# Patient Record
Sex: FEMALE | Race: WHITE | NOT HISPANIC OR LATINO | URBAN - METROPOLITAN AREA
[De-identification: names, ages, dates, MRNs, and addresses within clinical notes are randomized per-mention and may not be internally consistent; named-entity substitution may affect disease eponyms.]

---

## 2023-12-10 ENCOUNTER — INPATIENT (INPATIENT)
Facility: HOSPITAL | Age: 83
LOS: 2 days | Discharge: HOME | DRG: 871 | End: 2023-12-13
Attending: INTERNAL MEDICINE | Admitting: INTERNAL MEDICINE
Payer: MEDICAID

## 2023-12-10 VITALS
OXYGEN SATURATION: 89 % | RESPIRATION RATE: 24 BRPM | TEMPERATURE: 102 F | DIASTOLIC BLOOD PRESSURE: 70 MMHG | WEIGHT: 151.9 LBS | SYSTOLIC BLOOD PRESSURE: 122 MMHG | HEART RATE: 102 BPM

## 2023-12-10 DIAGNOSIS — R06.00 DYSPNEA, UNSPECIFIED: ICD-10-CM

## 2023-12-10 DIAGNOSIS — R07.9 CHEST PAIN, UNSPECIFIED: ICD-10-CM

## 2023-12-10 DIAGNOSIS — J18.9 PNEUMONIA, UNSPECIFIED ORGANISM: ICD-10-CM

## 2023-12-10 DIAGNOSIS — Z29.9 ENCOUNTER FOR PROPHYLACTIC MEASURES, UNSPECIFIED: ICD-10-CM

## 2023-12-10 DIAGNOSIS — N39.0 URINARY TRACT INFECTION, SITE NOT SPECIFIED: ICD-10-CM

## 2023-12-10 DIAGNOSIS — I10 ESSENTIAL (PRIMARY) HYPERTENSION: ICD-10-CM

## 2023-12-10 LAB
ALBUMIN SERPL ELPH-MCNC: 2.6 G/DL — LOW (ref 3.5–5)
ALBUMIN SERPL ELPH-MCNC: 2.6 G/DL — LOW (ref 3.5–5)
ALP SERPL-CCNC: 153 U/L — HIGH (ref 40–120)
ALP SERPL-CCNC: 153 U/L — HIGH (ref 40–120)
ALT FLD-CCNC: 41 U/L DA — SIGNIFICANT CHANGE UP (ref 10–60)
ALT FLD-CCNC: 41 U/L DA — SIGNIFICANT CHANGE UP (ref 10–60)
ANION GAP SERPL CALC-SCNC: 7 MMOL/L — SIGNIFICANT CHANGE UP (ref 5–17)
ANION GAP SERPL CALC-SCNC: 7 MMOL/L — SIGNIFICANT CHANGE UP (ref 5–17)
APPEARANCE UR: ABNORMAL
APPEARANCE UR: ABNORMAL
APTT BLD: 31.2 SEC — SIGNIFICANT CHANGE UP (ref 24.5–35.6)
APTT BLD: 31.2 SEC — SIGNIFICANT CHANGE UP (ref 24.5–35.6)
AST SERPL-CCNC: 28 U/L — SIGNIFICANT CHANGE UP (ref 10–40)
AST SERPL-CCNC: 28 U/L — SIGNIFICANT CHANGE UP (ref 10–40)
BACTERIA # UR AUTO: ABNORMAL /HPF
BACTERIA # UR AUTO: ABNORMAL /HPF
BASE EXCESS BLDV CALC-SCNC: 2.9 MMOL/L — SIGNIFICANT CHANGE UP
BASE EXCESS BLDV CALC-SCNC: 2.9 MMOL/L — SIGNIFICANT CHANGE UP
BASOPHILS # BLD AUTO: 0.06 K/UL — SIGNIFICANT CHANGE UP (ref 0–0.2)
BASOPHILS # BLD AUTO: 0.06 K/UL — SIGNIFICANT CHANGE UP (ref 0–0.2)
BASOPHILS NFR BLD AUTO: 0.5 % — SIGNIFICANT CHANGE UP (ref 0–2)
BASOPHILS NFR BLD AUTO: 0.5 % — SIGNIFICANT CHANGE UP (ref 0–2)
BILIRUB SERPL-MCNC: 1 MG/DL — SIGNIFICANT CHANGE UP (ref 0.2–1.2)
BILIRUB SERPL-MCNC: 1 MG/DL — SIGNIFICANT CHANGE UP (ref 0.2–1.2)
BILIRUB UR-MCNC: ABNORMAL
BILIRUB UR-MCNC: ABNORMAL
BUN SERPL-MCNC: 17 MG/DL — SIGNIFICANT CHANGE UP (ref 7–18)
BUN SERPL-MCNC: 17 MG/DL — SIGNIFICANT CHANGE UP (ref 7–18)
CA-I SERPL-SCNC: SIGNIFICANT CHANGE UP MMOL/L (ref 1.15–1.33)
CA-I SERPL-SCNC: SIGNIFICANT CHANGE UP MMOL/L (ref 1.15–1.33)
CALCIUM SERPL-MCNC: 8.9 MG/DL — SIGNIFICANT CHANGE UP (ref 8.4–10.5)
CALCIUM SERPL-MCNC: 8.9 MG/DL — SIGNIFICANT CHANGE UP (ref 8.4–10.5)
CHLORIDE SERPL-SCNC: 103 MMOL/L — SIGNIFICANT CHANGE UP (ref 96–108)
CHLORIDE SERPL-SCNC: 103 MMOL/L — SIGNIFICANT CHANGE UP (ref 96–108)
CO2 SERPL-SCNC: 24 MMOL/L — SIGNIFICANT CHANGE UP (ref 22–31)
CO2 SERPL-SCNC: 24 MMOL/L — SIGNIFICANT CHANGE UP (ref 22–31)
COD CRY URNS QL: PRESENT
COD CRY URNS QL: PRESENT
COLOR SPEC: SIGNIFICANT CHANGE UP
COLOR SPEC: SIGNIFICANT CHANGE UP
CREAT SERPL-MCNC: 0.98 MG/DL — SIGNIFICANT CHANGE UP (ref 0.5–1.3)
CREAT SERPL-MCNC: 0.98 MG/DL — SIGNIFICANT CHANGE UP (ref 0.5–1.3)
DIFF PNL FLD: ABNORMAL
DIFF PNL FLD: ABNORMAL
EGFR: 57 ML/MIN/1.73M2 — LOW
EGFR: 57 ML/MIN/1.73M2 — LOW
EOSINOPHIL # BLD AUTO: 0.02 K/UL — SIGNIFICANT CHANGE UP (ref 0–0.5)
EOSINOPHIL # BLD AUTO: 0.02 K/UL — SIGNIFICANT CHANGE UP (ref 0–0.5)
EOSINOPHIL NFR BLD AUTO: 0.2 % — SIGNIFICANT CHANGE UP (ref 0–6)
EOSINOPHIL NFR BLD AUTO: 0.2 % — SIGNIFICANT CHANGE UP (ref 0–6)
EPI CELLS # UR: PRESENT
EPI CELLS # UR: PRESENT
ERYTHROCYTE [SEDIMENTATION RATE] IN BLOOD: 87 MM/HR — HIGH (ref 0–20)
ERYTHROCYTE [SEDIMENTATION RATE] IN BLOOD: 87 MM/HR — HIGH (ref 0–20)
GAS PNL BLDV: 132 MMOL/L — LOW (ref 136–145)
GAS PNL BLDV: 132 MMOL/L — LOW (ref 136–145)
GAS PNL BLDV: SIGNIFICANT CHANGE UP
GAS PNL BLDV: SIGNIFICANT CHANGE UP
GLUCOSE SERPL-MCNC: 115 MG/DL — HIGH (ref 70–99)
GLUCOSE SERPL-MCNC: 115 MG/DL — HIGH (ref 70–99)
GLUCOSE UR QL: NEGATIVE MG/DL — SIGNIFICANT CHANGE UP
GLUCOSE UR QL: NEGATIVE MG/DL — SIGNIFICANT CHANGE UP
HCO3 BLDV-SCNC: 26 MMOL/L — SIGNIFICANT CHANGE UP (ref 22–29)
HCO3 BLDV-SCNC: 26 MMOL/L — SIGNIFICANT CHANGE UP (ref 22–29)
HCT VFR BLD CALC: 34.3 % — LOW (ref 34.5–45)
HCT VFR BLD CALC: 34.3 % — LOW (ref 34.5–45)
HGB BLD-MCNC: 11.4 G/DL — LOW (ref 11.5–15.5)
HGB BLD-MCNC: 11.4 G/DL — LOW (ref 11.5–15.5)
IMM GRANULOCYTES NFR BLD AUTO: 0.4 % — SIGNIFICANT CHANGE UP (ref 0–0.9)
IMM GRANULOCYTES NFR BLD AUTO: 0.4 % — SIGNIFICANT CHANGE UP (ref 0–0.9)
INR BLD: 1.16 RATIO — SIGNIFICANT CHANGE UP (ref 0.85–1.18)
INR BLD: 1.16 RATIO — SIGNIFICANT CHANGE UP (ref 0.85–1.18)
KETONES UR-MCNC: 15 MG/DL
KETONES UR-MCNC: 15 MG/DL
LACTATE BLDV-MCNC: 1.5 MMOL/L — SIGNIFICANT CHANGE UP (ref 0.5–2)
LACTATE BLDV-MCNC: 1.5 MMOL/L — SIGNIFICANT CHANGE UP (ref 0.5–2)
LACTATE SERPL-SCNC: 1.2 MMOL/L — SIGNIFICANT CHANGE UP (ref 0.7–2)
LACTATE SERPL-SCNC: 1.2 MMOL/L — SIGNIFICANT CHANGE UP (ref 0.7–2)
LEUKOCYTE ESTERASE UR-ACNC: ABNORMAL
LEUKOCYTE ESTERASE UR-ACNC: ABNORMAL
LYMPHOCYTES # BLD AUTO: 1.21 K/UL — SIGNIFICANT CHANGE UP (ref 1–3.3)
LYMPHOCYTES # BLD AUTO: 1.21 K/UL — SIGNIFICANT CHANGE UP (ref 1–3.3)
LYMPHOCYTES # BLD AUTO: 10.2 % — LOW (ref 13–44)
LYMPHOCYTES # BLD AUTO: 10.2 % — LOW (ref 13–44)
MCHC RBC-ENTMCNC: 30 PG — SIGNIFICANT CHANGE UP (ref 27–34)
MCHC RBC-ENTMCNC: 30 PG — SIGNIFICANT CHANGE UP (ref 27–34)
MCHC RBC-ENTMCNC: 33.2 GM/DL — SIGNIFICANT CHANGE UP (ref 32–36)
MCHC RBC-ENTMCNC: 33.2 GM/DL — SIGNIFICANT CHANGE UP (ref 32–36)
MCV RBC AUTO: 90.3 FL — SIGNIFICANT CHANGE UP (ref 80–100)
MCV RBC AUTO: 90.3 FL — SIGNIFICANT CHANGE UP (ref 80–100)
MONOCYTES # BLD AUTO: 1.02 K/UL — HIGH (ref 0–0.9)
MONOCYTES # BLD AUTO: 1.02 K/UL — HIGH (ref 0–0.9)
MONOCYTES NFR BLD AUTO: 8.6 % — SIGNIFICANT CHANGE UP (ref 2–14)
MONOCYTES NFR BLD AUTO: 8.6 % — SIGNIFICANT CHANGE UP (ref 2–14)
NEUTROPHILS # BLD AUTO: 9.54 K/UL — HIGH (ref 1.8–7.4)
NEUTROPHILS # BLD AUTO: 9.54 K/UL — HIGH (ref 1.8–7.4)
NEUTROPHILS NFR BLD AUTO: 80.1 % — HIGH (ref 43–77)
NEUTROPHILS NFR BLD AUTO: 80.1 % — HIGH (ref 43–77)
NITRITE UR-MCNC: POSITIVE
NITRITE UR-MCNC: POSITIVE
NRBC # BLD: 0 /100 WBCS — SIGNIFICANT CHANGE UP (ref 0–0)
NRBC # BLD: 0 /100 WBCS — SIGNIFICANT CHANGE UP (ref 0–0)
NT-PROBNP SERPL-SCNC: 936 PG/ML — HIGH (ref 0–450)
NT-PROBNP SERPL-SCNC: 936 PG/ML — HIGH (ref 0–450)
PCO2 BLDV: 34 MMHG — LOW (ref 39–42)
PCO2 BLDV: 34 MMHG — LOW (ref 39–42)
PH BLDV: 7.49 — HIGH (ref 7.32–7.43)
PH BLDV: 7.49 — HIGH (ref 7.32–7.43)
PH UR: 5.5 — SIGNIFICANT CHANGE UP (ref 5–8)
PH UR: 5.5 — SIGNIFICANT CHANGE UP (ref 5–8)
PLATELET # BLD AUTO: 353 K/UL — SIGNIFICANT CHANGE UP (ref 150–400)
PLATELET # BLD AUTO: 353 K/UL — SIGNIFICANT CHANGE UP (ref 150–400)
PO2 BLDV: 55 MMHG — SIGNIFICANT CHANGE UP
PO2 BLDV: 55 MMHG — SIGNIFICANT CHANGE UP
POTASSIUM BLDV-SCNC: 3.7 MMOL/L — SIGNIFICANT CHANGE UP (ref 3.5–5.1)
POTASSIUM BLDV-SCNC: 3.7 MMOL/L — SIGNIFICANT CHANGE UP (ref 3.5–5.1)
POTASSIUM SERPL-MCNC: 3.5 MMOL/L — SIGNIFICANT CHANGE UP (ref 3.5–5.3)
POTASSIUM SERPL-MCNC: 3.5 MMOL/L — SIGNIFICANT CHANGE UP (ref 3.5–5.3)
POTASSIUM SERPL-SCNC: 3.5 MMOL/L — SIGNIFICANT CHANGE UP (ref 3.5–5.3)
POTASSIUM SERPL-SCNC: 3.5 MMOL/L — SIGNIFICANT CHANGE UP (ref 3.5–5.3)
PROT SERPL-MCNC: 7.7 G/DL — SIGNIFICANT CHANGE UP (ref 6–8.3)
PROT SERPL-MCNC: 7.7 G/DL — SIGNIFICANT CHANGE UP (ref 6–8.3)
PROT UR-MCNC: 100 MG/DL
PROT UR-MCNC: 100 MG/DL
PROTHROM AB SERPL-ACNC: 13.2 SEC — HIGH (ref 9.5–13)
PROTHROM AB SERPL-ACNC: 13.2 SEC — HIGH (ref 9.5–13)
RAPID RVP RESULT: SIGNIFICANT CHANGE UP
RAPID RVP RESULT: SIGNIFICANT CHANGE UP
RBC # BLD: 3.8 M/UL — SIGNIFICANT CHANGE UP (ref 3.8–5.2)
RBC # BLD: 3.8 M/UL — SIGNIFICANT CHANGE UP (ref 3.8–5.2)
RBC # FLD: 14.4 % — SIGNIFICANT CHANGE UP (ref 10.3–14.5)
RBC # FLD: 14.4 % — SIGNIFICANT CHANGE UP (ref 10.3–14.5)
RBC CASTS # UR COMP ASSIST: 5 /HPF — HIGH (ref 0–4)
RBC CASTS # UR COMP ASSIST: 5 /HPF — HIGH (ref 0–4)
SAO2 % BLDV: 87.6 % — SIGNIFICANT CHANGE UP
SAO2 % BLDV: 87.6 % — SIGNIFICANT CHANGE UP
SARS-COV-2 RNA SPEC QL NAA+PROBE: SIGNIFICANT CHANGE UP
SARS-COV-2 RNA SPEC QL NAA+PROBE: SIGNIFICANT CHANGE UP
SODIUM SERPL-SCNC: 134 MMOL/L — LOW (ref 135–145)
SODIUM SERPL-SCNC: 134 MMOL/L — LOW (ref 135–145)
SP GR SPEC: 1.02 — SIGNIFICANT CHANGE UP (ref 1–1.03)
SP GR SPEC: 1.02 — SIGNIFICANT CHANGE UP (ref 1–1.03)
TROPONIN I, HIGH SENSITIVITY RESULT: 38.3 NG/L — SIGNIFICANT CHANGE UP
TROPONIN I, HIGH SENSITIVITY RESULT: 38.3 NG/L — SIGNIFICANT CHANGE UP
TROPONIN I, HIGH SENSITIVITY RESULT: 41.5 NG/L — SIGNIFICANT CHANGE UP
TROPONIN I, HIGH SENSITIVITY RESULT: 41.5 NG/L — SIGNIFICANT CHANGE UP
UROBILINOGEN FLD QL: 2 MG/DL (ref 0.2–1)
UROBILINOGEN FLD QL: 2 MG/DL (ref 0.2–1)
WBC # BLD: 11.9 K/UL — HIGH (ref 3.8–10.5)
WBC # BLD: 11.9 K/UL — HIGH (ref 3.8–10.5)
WBC # FLD AUTO: 11.9 K/UL — HIGH (ref 3.8–10.5)
WBC # FLD AUTO: 11.9 K/UL — HIGH (ref 3.8–10.5)
WBC UR QL: 50 /HPF — HIGH (ref 0–5)
WBC UR QL: 50 /HPF — HIGH (ref 0–5)

## 2023-12-10 PROCEDURE — 99285 EMERGENCY DEPT VISIT HI MDM: CPT

## 2023-12-10 PROCEDURE — 71045 X-RAY EXAM CHEST 1 VIEW: CPT | Mod: 26

## 2023-12-10 RX ORDER — SODIUM CHLORIDE 9 MG/ML
500 INJECTION INTRAMUSCULAR; INTRAVENOUS; SUBCUTANEOUS ONCE
Refills: 0 | Status: COMPLETED | OUTPATIENT
Start: 2023-12-10 | End: 2023-12-10

## 2023-12-10 RX ORDER — ACETAMINOPHEN 500 MG
650 TABLET ORAL EVERY 6 HOURS
Refills: 0 | Status: DISCONTINUED | OUTPATIENT
Start: 2023-12-10 | End: 2023-12-13

## 2023-12-10 RX ORDER — SODIUM CHLORIDE 9 MG/ML
1000 INJECTION INTRAMUSCULAR; INTRAVENOUS; SUBCUTANEOUS ONCE
Refills: 0 | Status: COMPLETED | OUTPATIENT
Start: 2023-12-10 | End: 2023-12-10

## 2023-12-10 RX ORDER — AZITHROMYCIN 500 MG/1
500 TABLET, FILM COATED ORAL EVERY 24 HOURS
Refills: 0 | Status: DISCONTINUED | OUTPATIENT
Start: 2023-12-11 | End: 2023-12-13

## 2023-12-10 RX ORDER — ENOXAPARIN SODIUM 100 MG/ML
40 INJECTION SUBCUTANEOUS EVERY 24 HOURS
Refills: 0 | Status: DISCONTINUED | OUTPATIENT
Start: 2023-12-10 | End: 2023-12-13

## 2023-12-10 RX ORDER — ACETAMINOPHEN 500 MG
1000 TABLET ORAL ONCE
Refills: 0 | Status: COMPLETED | OUTPATIENT
Start: 2023-12-10 | End: 2023-12-10

## 2023-12-10 RX ORDER — CEFTRIAXONE 500 MG/1
1000 INJECTION, POWDER, FOR SOLUTION INTRAMUSCULAR; INTRAVENOUS EVERY 24 HOURS
Refills: 0 | Status: DISCONTINUED | OUTPATIENT
Start: 2023-12-11 | End: 2023-12-13

## 2023-12-10 RX ORDER — CEFTRIAXONE 500 MG/1
1000 INJECTION, POWDER, FOR SOLUTION INTRAMUSCULAR; INTRAVENOUS ONCE
Refills: 0 | Status: COMPLETED | OUTPATIENT
Start: 2023-12-10 | End: 2023-12-10

## 2023-12-10 RX ORDER — AZITHROMYCIN 500 MG/1
500 TABLET, FILM COATED ORAL ONCE
Refills: 0 | Status: COMPLETED | OUTPATIENT
Start: 2023-12-10 | End: 2023-12-10

## 2023-12-10 RX ADMIN — Medication 1000 MILLIGRAM(S): at 17:44

## 2023-12-10 RX ADMIN — CEFTRIAXONE 100 MILLIGRAM(S): 500 INJECTION, POWDER, FOR SOLUTION INTRAMUSCULAR; INTRAVENOUS at 16:28

## 2023-12-10 RX ADMIN — Medication 400 MILLIGRAM(S): at 14:55

## 2023-12-10 RX ADMIN — SODIUM CHLORIDE 500 MILLILITER(S): 9 INJECTION INTRAMUSCULAR; INTRAVENOUS; SUBCUTANEOUS at 16:33

## 2023-12-10 RX ADMIN — Medication 650 MILLIGRAM(S): at 21:40

## 2023-12-10 RX ADMIN — SODIUM CHLORIDE 1000 MILLILITER(S): 9 INJECTION INTRAMUSCULAR; INTRAVENOUS; SUBCUTANEOUS at 14:55

## 2023-12-10 RX ADMIN — Medication 650 MILLIGRAM(S): at 21:10

## 2023-12-10 RX ADMIN — SODIUM CHLORIDE 1000 MILLILITER(S): 9 INJECTION INTRAMUSCULAR; INTRAVENOUS; SUBCUTANEOUS at 15:57

## 2023-12-10 RX ADMIN — ENOXAPARIN SODIUM 40 MILLIGRAM(S): 100 INJECTION SUBCUTANEOUS at 21:10

## 2023-12-10 RX ADMIN — AZITHROMYCIN 500 MILLIGRAM(S): 500 TABLET, FILM COATED ORAL at 16:28

## 2023-12-10 RX ADMIN — Medication 1000 MILLIGRAM(S): at 15:47

## 2023-12-10 NOTE — H&P ADULT - HISTORY OF PRESENT ILLNESS
75 yrs old F, from home, ambulating with walker, pmhx of CAD, ?MI 7 yrs ago, presented with cough, fever, chills, urinary symptoms, and myalgia. Pt stated that for the past 6 months, she had been having cough, which has worsened for the past 10 days, along with fever and chills of 2 days duration. Pt endorsed dysuria mildly improved along with urinary frequency and urgency without any blood in urine. Pt reported of having chest pain after cough has started, central pressure like without radiation to the arm, neck, jaw, intermittent, associated with cough, denied palpitation abdominal pain, N/V/D, swelling of the legs, headache, dizziness or blurring of vision.   Pt had recently traveled from [place] about 2 weeks ago, she was diagnosed with PNA and completed course of Abx at that time. Pt endorsed being exposed to TB [her mother] in the past however did not have any personal history of TB, denied sick contacts.   Pt and family at bedside and over the phone, reported of history of MI about 7 yrs ago however no stent placement or other interventions done, denied other hx of cardiac diseases, and last echo was done about 2 yrs ago.  75 yrs old F, from home, ambulating with walker, pmhx of CAD, ?MI 7 yrs ago, presented with cough, fever, chills, urinary symptoms, and myalgia. Pt stated that for the past 6 months, she had been having cough, which has worsened for the past 10 days, along with fever and chills of 2 days duration. Pt endorsed dysuria mildly improved along with urinary frequency and urgency without any blood in urine. Pt reported of having chest pain after cough has started, central pressure like without radiation to the arm, neck, jaw, intermittent, associated with cough, denied palpitation abdominal pain, N/V/D, swelling of the legs, headache, dizziness or blurring of vision.   Pt had recently traveled from [place] about 2 weeks ago, she was diagnosed with PNA and completed course of Abx at that time. Pt endorsed being exposed to TB [her mother] in the past however did not have any personal history of TB, denied sick contacts.   Pt and family at bedside and over the phone, reported of history of MI about 7 yrs ago however no stent placement or other interventions done, denied other hx of cardiac diseases, and last echo was done about 2 yrs ago.

## 2023-12-10 NOTE — H&P ADULT - NSHPREVIEWOFSYSTEMS_GEN_ALL_CORE
REVIEW OF SYSTEMS:    CONSTITUTIONAL: No weakness, + fevers or chills  EYES/ENT: No visual changes;  No vertigo or throat pain   NECK: No pain or stiffness  RESPIRATORY: No cough, wheezing, hemoptysis; + shortness of breath  CARDIOVASCULAR: No chest pain or palpitations  GASTROINTESTINAL: No abdominal or epigastric pain. No nausea, vomiting, or hematemesis; No diarrhea or constipation. No melena or hematochezia.  GENITOURINARY: + dysuria, frequency or hematuria  NEUROLOGICAL: No numbness or weakness  SKIN: No itching, rashes

## 2023-12-10 NOTE — H&P ADULT - PROBLEM SELECTOR PLAN 4
DVT- Lovenox chest pain associated with cough  trop x2 neg   EKG NSR  murmur noted on exam  f/u Echo  Cardio Dr. Oviedo consulted

## 2023-12-10 NOTE — H&P ADULT - NSHPPHYSICALEXAM_GEN_ALL_CORE
GENERAL: NAD, lying in bed comfortably  HEAD:  Atraumatic, Normocephalic  EYES: EOMI, PERRLA, conjunctiva and sclera clear  ENT: Moist mucous membranes  NECK: Supple, No JVD  CHEST/LUNG: Decreased air entry on Rt lower lobe, No rales, rhonchi, + wheezing, Unlabored respirations, on 2L NC   HEART: Regular rate and rhythm; + systolic murmur at the Rt upper sternal border with radiation to the left upper sternal border, + murmur present at apex, No rubs, or gallops  ABDOMEN: Bowel sounds present; Soft, Nontender, Nondistended.   EXTREMITIES:  2+ Peripheral Pulses, brisk capillary refill. No clubbing, cyanosis, or edema  NERVOUS SYSTEM:  Alert & Oriented X3, speech clear. No deficits   MSK: FROM all 4 extremities, full and equal strength  SKIN: No rashes or lesions

## 2023-12-10 NOTE — ED PROVIDER NOTE - PHYSICAL EXAMINATION
Constitutional: Well-appearing, well-nourished, comfortable appearing.   Head: Normocephalic, atraumatic.   Eyes: PERRL. EOMI. No conjunctival pallor.   ENT: Dry mucous membranes. Uvula midline. No pharyngeal erythema or exudates.  Neck: No LAD. Supple.  CVS: Tachycardic rate, regular rhythm. Normal S1, S2. No murmurs, rubs, or gallops. No peripheral edema noted.   Respiratory: No respiratory distress. Clear to auscultation bilaterally. No wheezing, rales, or rhonchi.   Abdomen: Abd is soft and non-distended. No tenderness. No rebound, guarding, or rigidity.   MSK: No CVA tenderness bilaterally.   Neuro: Alert and oriented to person, place, and time. Follows commands. Moves all extremities.   Skin: Hot to the touch and dry. No rashes.   Psych: Normal affect, cooperative.

## 2023-12-10 NOTE — H&P ADULT - PROBLEM SELECTOR PLAN 2
p/w dyspnea and cough  worsening cough for past 10 days however cough present for 6 months at least  hx of TB exposure many years ago   no personal hx of TB   f/u TB quantiferone test  f/u CTA to rule out for PE [recent hx of long- haul flight]   rest of plan as above [PNA] p/w dyspnea and cough  worsening cough for past 10 days however cough present for 6 months at least  hx of TB exposure many years ago , no personal hx of TB   f/u TB quantiferone test  f/u CTA to rule out for PE [recent hx of long- haul flight]   rest of plan as above [PNA]

## 2023-12-10 NOTE — ED PROVIDER NOTE - OBJECTIVE STATEMENT
83-year-old female with past medical history of CAD, HTN presenting to the ED for evaluation of worsening cough over the past week associated with posttussive chest and rib pain.  Daughter states that the patient has also been febrile at home with chills and myalgias and arthralgias.  They went to an urgent care this morning and found the patient to be 89% on room air with a COVID-negative test.  Patient denies any changes in vision, changes in speech, numbness, weakness, headache, dysuria, hematuria, flank pain, nausea, vomiting, diarrhea, abdominal pain, and any additional complaints at this time.  No sick contacts.    PMD: None

## 2023-12-10 NOTE — H&P ADULT - ASSESSMENT
75 yrs old F, from home, ambulating with walker, pmhx of CAD, ?MI 7 yrs ago, presented with cough, fever, chills, urinary symptoms, and myalgia. Pt is admitted for pneumonia, UTI and further evaluation of dyspnea including rule out PE.

## 2023-12-10 NOTE — ED PROVIDER NOTE - CLINICAL SUMMARY MEDICAL DECISION MAKING FREE TEXT BOX
83-year-old female with CAD, HTN, here with fever, cough, chills, myalgias and arthralgias over the past week and hypoxic at outside urgent care.  In the ED, the patient is febrile, mildly tachycardic, normotensive, and satting 87 to 89% on room air.  Patient placed on 3 L nasal cannula with improvement to the mid to high 90s.  Patient given Ofirmev for fever.  Septic workup initiated.  Patient received 1.5 L of fluids.  RVP sent.  Likely admit.

## 2023-12-10 NOTE — H&P ADULT - ATTENDING COMMENTS
Pt's presentation appearing consistent with reactive airway disease (with report of long-standing h/o asthmatic COPD) w/ negative RVP thus far and no reports of any expectorations. However given [place] ([place], [place] lives[place] will consider r/o T.b. At present will initiate rx for CAP (given b/l consolidations on cxr)  / UTI with IV zithro / rocephin. Pulm consult (Dr Daly) to follow. Pt's presentation appearing consistent with reactive airway disease (with report of long-standing h/o asthmatic COPD) w/ negative RVP thus far and no reports of any expectorations. However given her [place], [place] lives[place] will consider r/o T.b. At present will initiate rx for CAP (given b/l consolidations on cxr)  / UTI with IV zithro / rocephin. Pulm consult (Dr Daly) to follow.

## 2023-12-10 NOTE — H&P ADULT - PROBLEM SELECTOR PLAN 3
hx of HTN on Nebivolol   will hold home meds for now as normotensive p/w dysuria, frequency and urgency   U/A positive  c/w Rocephin   f/u urine culture

## 2023-12-10 NOTE — H&P ADULT - PROBLEM SELECTOR PLAN 1
p/w cough, fever, chills, dyspnea   CXR: Bilateral PNA  in ED: [id number] , ,  /70 Sat 89% on RA   mild leukoctyosis 11.9 with left shift p/w cough, fever, chills, dyspnea   CXR: Bilateral PNA  in ED: [id number] , ,  /70 Sat 89% on RA   mild leukoctyosis 11.9 with left shift p/w cough, fever, chills, dyspnea   CXR: Bilateral lunf infiltrates, irregular infiltrate of the right upper hilum   in ED: [id number] , ,  /70 Sat 89% on RA   mild leukocytosis 11.9 with left shift  s/p Azithro and Rocephin and LR 1.5L  bolus   RVP panel neg   c/w Azithro and Rocephin   f/u blood cultures, sputum culture, legionella, and strep pneumo Ag urine  Dr. Daly Pulm consulted p/w cough, fever, chills, dyspnea   CXR: Bilateral lunf infiltrates, irregular infiltrate of the right upper hilum   in ED: [id number] , ,  /70 Sat 89% on RA   mild leukocytosis 11.9 with left shift  s/p Azithro and Rocephin and LR 1.5L  bolus   RVP panel neg   c/w Azithro and Rocephin   f/u blood cultures, sputum culture, legionella, and strep pneumo Ag urine  Dr. Daly Pulm consulted

## 2023-12-10 NOTE — H&P ADULT - NSHPLABSRESULTS_GEN_ALL_CORE
ACC: [id number] EXAM:  XR CHEST PORTABLE URGENT 1V   ORDERED BY:  CORAL GODWIN     PROCEDURE DATE:  12/10/2023          INTERPRETATION:  AP chest on December 10, 2023 at 1:53 PM. Patient has   cough with fever and hypoxia.    COMPARISON:None available.    Heart magnified by technique.    There are mild bibasilar infiltrates left greater than right.    Another irregular infiltrate is suspect off the right upper hilum.    IMPRESSION: Bilateral lung infiltrates as above.    --- End of Report ---        CLIVE STALLWORTH MD; Attending Radiologist  This document has been electronically signed. Dec 10 2023  4:56PM ACC: [id number] EXAM:  XR CHEST PORTABLE URGENT 1V   ORDERED BY:  CORAL GODWIN     PROCEDURE DATE:  12/10/2023          INTERPRETATION:  AP chest on December 10, 2023 at 1:53 PM. Patient has   cough with fever and hypoxia.    COMPARISON:None available.    Heart magnified by technique.    There are mild bibasilar infiltrates left greater than right.    Another irregular infiltrate is suspect off the right upper hilum.    IMPRESSION: Bilateral lung infiltrates as above.    --- End of Report ---        CLIVE STALLWORTH MD; Attending Radiologist  This document has been electronically signed. Dec 10 2023  4:56PM

## 2023-12-11 DIAGNOSIS — J18.9 PNEUMONIA, UNSPECIFIED ORGANISM: ICD-10-CM

## 2023-12-11 LAB
ALBUMIN SERPL ELPH-MCNC: 2.2 G/DL — LOW (ref 3.5–5)
ALBUMIN SERPL ELPH-MCNC: 2.2 G/DL — LOW (ref 3.5–5)
ALP SERPL-CCNC: 137 U/L — HIGH (ref 40–120)
ALP SERPL-CCNC: 137 U/L — HIGH (ref 40–120)
ALT FLD-CCNC: 39 U/L DA — SIGNIFICANT CHANGE UP (ref 10–60)
ALT FLD-CCNC: 39 U/L DA — SIGNIFICANT CHANGE UP (ref 10–60)
ANION GAP SERPL CALC-SCNC: 7 MMOL/L — SIGNIFICANT CHANGE UP (ref 5–17)
ANION GAP SERPL CALC-SCNC: 7 MMOL/L — SIGNIFICANT CHANGE UP (ref 5–17)
AST SERPL-CCNC: 31 U/L — SIGNIFICANT CHANGE UP (ref 10–40)
AST SERPL-CCNC: 31 U/L — SIGNIFICANT CHANGE UP (ref 10–40)
BASOPHILS # BLD AUTO: 0.06 K/UL — SIGNIFICANT CHANGE UP (ref 0–0.2)
BASOPHILS # BLD AUTO: 0.06 K/UL — SIGNIFICANT CHANGE UP (ref 0–0.2)
BASOPHILS NFR BLD AUTO: 0.6 % — SIGNIFICANT CHANGE UP (ref 0–2)
BASOPHILS NFR BLD AUTO: 0.6 % — SIGNIFICANT CHANGE UP (ref 0–2)
BILIRUB SERPL-MCNC: 0.9 MG/DL — SIGNIFICANT CHANGE UP (ref 0.2–1.2)
BILIRUB SERPL-MCNC: 0.9 MG/DL — SIGNIFICANT CHANGE UP (ref 0.2–1.2)
BUN SERPL-MCNC: 13 MG/DL — SIGNIFICANT CHANGE UP (ref 7–18)
BUN SERPL-MCNC: 13 MG/DL — SIGNIFICANT CHANGE UP (ref 7–18)
CALCIUM SERPL-MCNC: 8.3 MG/DL — LOW (ref 8.4–10.5)
CALCIUM SERPL-MCNC: 8.3 MG/DL — LOW (ref 8.4–10.5)
CHLORIDE SERPL-SCNC: 105 MMOL/L — SIGNIFICANT CHANGE UP (ref 96–108)
CHLORIDE SERPL-SCNC: 105 MMOL/L — SIGNIFICANT CHANGE UP (ref 96–108)
CO2 SERPL-SCNC: 23 MMOL/L — SIGNIFICANT CHANGE UP (ref 22–31)
CO2 SERPL-SCNC: 23 MMOL/L — SIGNIFICANT CHANGE UP (ref 22–31)
CREAT SERPL-MCNC: 0.83 MG/DL — SIGNIFICANT CHANGE UP (ref 0.5–1.3)
CREAT SERPL-MCNC: 0.83 MG/DL — SIGNIFICANT CHANGE UP (ref 0.5–1.3)
CRP SERPL-MCNC: 303 MG/L — HIGH
CRP SERPL-MCNC: 303 MG/L — HIGH
EGFR: 69 ML/MIN/1.73M2 — SIGNIFICANT CHANGE UP
EGFR: 69 ML/MIN/1.73M2 — SIGNIFICANT CHANGE UP
EOSINOPHIL # BLD AUTO: 0.06 K/UL — SIGNIFICANT CHANGE UP (ref 0–0.5)
EOSINOPHIL # BLD AUTO: 0.06 K/UL — SIGNIFICANT CHANGE UP (ref 0–0.5)
EOSINOPHIL NFR BLD AUTO: 0.6 % — SIGNIFICANT CHANGE UP (ref 0–6)
EOSINOPHIL NFR BLD AUTO: 0.6 % — SIGNIFICANT CHANGE UP (ref 0–6)
GLUCOSE SERPL-MCNC: 113 MG/DL — HIGH (ref 70–99)
GLUCOSE SERPL-MCNC: 113 MG/DL — HIGH (ref 70–99)
HCT VFR BLD CALC: 28.5 % — LOW (ref 34.5–45)
HCT VFR BLD CALC: 28.5 % — LOW (ref 34.5–45)
HGB BLD-MCNC: 9.3 G/DL — LOW (ref 11.5–15.5)
HGB BLD-MCNC: 9.3 G/DL — LOW (ref 11.5–15.5)
IMM GRANULOCYTES NFR BLD AUTO: 0.7 % — SIGNIFICANT CHANGE UP (ref 0–0.9)
IMM GRANULOCYTES NFR BLD AUTO: 0.7 % — SIGNIFICANT CHANGE UP (ref 0–0.9)
LEGIONELLA AG UR QL: NEGATIVE — SIGNIFICANT CHANGE UP
LEGIONELLA AG UR QL: NEGATIVE — SIGNIFICANT CHANGE UP
LYMPHOCYTES # BLD AUTO: 1.37 K/UL — SIGNIFICANT CHANGE UP (ref 1–3.3)
LYMPHOCYTES # BLD AUTO: 1.37 K/UL — SIGNIFICANT CHANGE UP (ref 1–3.3)
LYMPHOCYTES # BLD AUTO: 14.2 % — SIGNIFICANT CHANGE UP (ref 13–44)
LYMPHOCYTES # BLD AUTO: 14.2 % — SIGNIFICANT CHANGE UP (ref 13–44)
MAGNESIUM SERPL-MCNC: 1.7 MG/DL — SIGNIFICANT CHANGE UP (ref 1.6–2.6)
MAGNESIUM SERPL-MCNC: 1.7 MG/DL — SIGNIFICANT CHANGE UP (ref 1.6–2.6)
MCHC RBC-ENTMCNC: 29.1 PG — SIGNIFICANT CHANGE UP (ref 27–34)
MCHC RBC-ENTMCNC: 29.1 PG — SIGNIFICANT CHANGE UP (ref 27–34)
MCHC RBC-ENTMCNC: 32.6 GM/DL — SIGNIFICANT CHANGE UP (ref 32–36)
MCHC RBC-ENTMCNC: 32.6 GM/DL — SIGNIFICANT CHANGE UP (ref 32–36)
MCV RBC AUTO: 89.1 FL — SIGNIFICANT CHANGE UP (ref 80–100)
MCV RBC AUTO: 89.1 FL — SIGNIFICANT CHANGE UP (ref 80–100)
MONOCYTES # BLD AUTO: 0.89 K/UL — SIGNIFICANT CHANGE UP (ref 0–0.9)
MONOCYTES # BLD AUTO: 0.89 K/UL — SIGNIFICANT CHANGE UP (ref 0–0.9)
MONOCYTES NFR BLD AUTO: 9.2 % — SIGNIFICANT CHANGE UP (ref 2–14)
MONOCYTES NFR BLD AUTO: 9.2 % — SIGNIFICANT CHANGE UP (ref 2–14)
NEUTROPHILS # BLD AUTO: 7.21 K/UL — SIGNIFICANT CHANGE UP (ref 1.8–7.4)
NEUTROPHILS # BLD AUTO: 7.21 K/UL — SIGNIFICANT CHANGE UP (ref 1.8–7.4)
NEUTROPHILS NFR BLD AUTO: 74.7 % — SIGNIFICANT CHANGE UP (ref 43–77)
NEUTROPHILS NFR BLD AUTO: 74.7 % — SIGNIFICANT CHANGE UP (ref 43–77)
NRBC # BLD: 0 /100 WBCS — SIGNIFICANT CHANGE UP (ref 0–0)
NRBC # BLD: 0 /100 WBCS — SIGNIFICANT CHANGE UP (ref 0–0)
PHOSPHATE SERPL-MCNC: 2.3 MG/DL — LOW (ref 2.5–4.5)
PHOSPHATE SERPL-MCNC: 2.3 MG/DL — LOW (ref 2.5–4.5)
PLATELET # BLD AUTO: 306 K/UL — SIGNIFICANT CHANGE UP (ref 150–400)
PLATELET # BLD AUTO: 306 K/UL — SIGNIFICANT CHANGE UP (ref 150–400)
POTASSIUM SERPL-MCNC: 3.3 MMOL/L — LOW (ref 3.5–5.3)
POTASSIUM SERPL-MCNC: 3.3 MMOL/L — LOW (ref 3.5–5.3)
POTASSIUM SERPL-SCNC: 3.3 MMOL/L — LOW (ref 3.5–5.3)
POTASSIUM SERPL-SCNC: 3.3 MMOL/L — LOW (ref 3.5–5.3)
PROT SERPL-MCNC: 6.6 G/DL — SIGNIFICANT CHANGE UP (ref 6–8.3)
PROT SERPL-MCNC: 6.6 G/DL — SIGNIFICANT CHANGE UP (ref 6–8.3)
RBC # BLD: 3.2 M/UL — LOW (ref 3.8–5.2)
RBC # BLD: 3.2 M/UL — LOW (ref 3.8–5.2)
RBC # FLD: 14.6 % — HIGH (ref 10.3–14.5)
RBC # FLD: 14.6 % — HIGH (ref 10.3–14.5)
S PNEUM AG UR QL: NEGATIVE — SIGNIFICANT CHANGE UP
S PNEUM AG UR QL: NEGATIVE — SIGNIFICANT CHANGE UP
SODIUM SERPL-SCNC: 135 MMOL/L — SIGNIFICANT CHANGE UP (ref 135–145)
SODIUM SERPL-SCNC: 135 MMOL/L — SIGNIFICANT CHANGE UP (ref 135–145)
WBC # BLD: 9.66 K/UL — SIGNIFICANT CHANGE UP (ref 3.8–10.5)
WBC # BLD: 9.66 K/UL — SIGNIFICANT CHANGE UP (ref 3.8–10.5)
WBC # FLD AUTO: 9.66 K/UL — SIGNIFICANT CHANGE UP (ref 3.8–10.5)
WBC # FLD AUTO: 9.66 K/UL — SIGNIFICANT CHANGE UP (ref 3.8–10.5)

## 2023-12-11 PROCEDURE — 71275 CT ANGIOGRAPHY CHEST: CPT | Mod: 26

## 2023-12-11 RX ORDER — BENZOCAINE AND MENTHOL 5; 1 G/100ML; G/100ML
1 LIQUID ORAL EVERY 4 HOURS
Refills: 0 | Status: DISCONTINUED | OUTPATIENT
Start: 2023-12-11 | End: 2023-12-13

## 2023-12-11 RX ORDER — INFLUENZA VIRUS VACCINE 15; 15; 15; 15 UG/.5ML; UG/.5ML; UG/.5ML; UG/.5ML
0.7 SUSPENSION INTRAMUSCULAR ONCE
Refills: 0 | Status: DISCONTINUED | OUTPATIENT
Start: 2023-12-11 | End: 2023-12-13

## 2023-12-11 RX ORDER — POTASSIUM CHLORIDE 20 MEQ
40 PACKET (EA) ORAL ONCE
Refills: 0 | Status: COMPLETED | OUTPATIENT
Start: 2023-12-11 | End: 2023-12-11

## 2023-12-11 RX ORDER — ACETAMINOPHEN 500 MG
1000 TABLET ORAL ONCE
Refills: 0 | Status: COMPLETED | OUTPATIENT
Start: 2023-12-11 | End: 2023-12-11

## 2023-12-11 RX ORDER — IPRATROPIUM/ALBUTEROL SULFATE 18-103MCG
3 AEROSOL WITH ADAPTER (GRAM) INHALATION EVERY 6 HOURS
Refills: 0 | Status: DISCONTINUED | OUTPATIENT
Start: 2023-12-11 | End: 2023-12-13

## 2023-12-11 RX ORDER — LANOLIN ALCOHOL/MO/W.PET/CERES
5 CREAM (GRAM) TOPICAL ONCE
Refills: 0 | Status: COMPLETED | OUTPATIENT
Start: 2023-12-11 | End: 2023-12-11

## 2023-12-11 RX ADMIN — Medication 3 MILLILITER(S): at 20:26

## 2023-12-11 RX ADMIN — Medication 1000 MILLIGRAM(S): at 23:15

## 2023-12-11 RX ADMIN — Medication 650 MILLIGRAM(S): at 04:31

## 2023-12-11 RX ADMIN — Medication 650 MILLIGRAM(S): at 05:01

## 2023-12-11 RX ADMIN — AZITHROMYCIN 255 MILLIGRAM(S): 500 TABLET, FILM COATED ORAL at 16:45

## 2023-12-11 RX ADMIN — Medication 100 MILLIGRAM(S): at 21:44

## 2023-12-11 RX ADMIN — Medication 100 MILLIGRAM(S): at 14:03

## 2023-12-11 RX ADMIN — Medication 40 MILLIEQUIVALENT(S): at 16:29

## 2023-12-11 RX ADMIN — Medication 5 MILLIGRAM(S): at 00:37

## 2023-12-11 RX ADMIN — CEFTRIAXONE 100 MILLIGRAM(S): 500 INJECTION, POWDER, FOR SOLUTION INTRAMUSCULAR; INTRAVENOUS at 16:45

## 2023-12-11 RX ADMIN — Medication 3 MILLILITER(S): at 11:54

## 2023-12-11 RX ADMIN — Medication 400 MILLIGRAM(S): at 22:22

## 2023-12-11 RX ADMIN — Medication 3 MILLILITER(S): at 16:33

## 2023-12-11 RX ADMIN — Medication 100 MILLIGRAM(S): at 21:46

## 2023-12-11 RX ADMIN — Medication 100 MILLIGRAM(S): at 11:54

## 2023-12-11 RX ADMIN — ENOXAPARIN SODIUM 40 MILLIGRAM(S): 100 INJECTION SUBCUTANEOUS at 21:44

## 2023-12-11 NOTE — PROGRESS NOTE ADULT - SUBJECTIVE AND OBJECTIVE BOX
MARILEE ESPINOZA  MR# [id number]  83yFemale        Patient is a 83y old  Female who presents with a chief complaint of sepsis 2/2 PNA and UTI (11 Dec 2023 11:51)      INTERVAL HPI/OVERNIGHT EVENTS:  Patient seen and examined at bedside. No notations of chest pain, palpitation, SOB, orthopnea, nausea, vomiting or abdominal pain.    ALLERGIES  No Known Allergies      MEDICATIONS  acetaminophen     Tablet .. 650 milliGRAM(s) Oral every 6 hours PRN Temp greater or equal to 38C (100.4F), Mild Pain (1 - 3)  albuterol/ipratropium for Nebulization 3 milliLiter(s) Nebulizer every 6 hours  azithromycin  IVPB 500 milliGRAM(s) IV Intermittent every 24 hours  benzocaine/menthol Lozenge 1 Lozenge Oral every 4 hours PRN Sore Throat  benzonatate 100 milliGRAM(s) Oral three times a day  cefTRIAXone   IVPB 1000 milliGRAM(s) IV Intermittent every 24 hours  enoxaparin Injectable 40 milliGRAM(s) SubCutaneous every 24 hours  guaiFENesin Oral Liquid (Sugar-Free) 100 milliGRAM(s) Oral every 6 hours PRN Cough              REVIEW OF SYSTEMS:  CONSTITUTIONAL: No fever, weight loss, or fatigue  EYES: No eye pain, visual disturbances, or discharge  ENT:  No difficulty hearing, tinnitus, vertigo; No sinus or throat pain  NECK: No pain or stiffness  RESPIRATORY: No cough, wheezing, chills or hemoptysis; No Shortness of Breath  CARDIOVASCULAR: No chest pain, palpitations, passing out, dizziness, or leg swelling  GASTROINTESTINAL: No abdominal or epigastric pain. No nausea, vomiting, or hematemesis; No diarrhea or constipation. No melena or hematochezia.  GENITOURINARY: No dysuria, frequency, hematuria, or incontinence  NEUROLOGICAL: No headaches, memory loss, loss of strength, numbness, or tremors  SKIN: No itching, burning, rashes, or lesions   LYMPH Nodes: No enlarged glands  ENDOCRINE: No heat or cold intolerance; No hair loss  MUSCULOSKELETAL: No joint pain or swelling; No muscle, back, or extremity pain  PSYCHIATRIC: No depression, anxiety, mood swings, or difficulty sleeping  HEME/LYMPH: No easy bruising, or bleeding gums  ALLERGY AND IMMUNOLOGIC: No hives or eczema	    [ ] All others negative	  [ ] Unable to obtain      T(C): 36.7 (12-11-23 @ 11:39), Max: 39 (12-10-23 @ 12:56)  T(F): 98 (12-11-23 @ 11:39), Max: 102.2 (12-10-23 @ 12:56)  HR: 74 (12-11-23 @ 11:39) (71 - 102)  BP: 97/60 (12-11-23 @ 11:39) (96/58 - 122/70)  RR: 20 (12-11-23 @ 11:39) (20 - 24)  SpO2: 96% (12-11-23 @ 11:39) (89% - 99%)  Wt(kg): --    Weight (kg): 68.9 (12-10 @ 12:56)  I&O's Summary        PHYSICAL EXAM:  A X O x  HEAD:  Atraumatic, Normocephalic  EYES: EOMI, PERRLA, conjunctiva and sclera clear  NECK: Supple, No JVD, Normal thyroid  Resp: CTAB, No crackles, wheezing,   CVS: Regular rate and rhythm; No discernable murmurs, rubs, or gallops  ABD: Soft, Nontender, Nondistended; Bowel sounds present  EXTREMITIES:  2+ Peripheral Pulses, No edema  LYMPH: No dicernable lymphadenopathy noted  GENERAL: NAD, well-groomed, well-developed      LABS:                        9.3    9.66  )-----------( 306      ( 11 Dec 2023 05:40 )             28.5     12-11    135  |  105  |  13  ----------------------------<  113<H>  3.3<L>   |  23  |  0.83    Ca    8.3<L>      11 Dec 2023 05:40  Phos  2.3     12-11  Mg     1.7     12-11    TPro  6.6  /  Alb  2.2<L>  /  TBili  0.9  /  DBili  x   /  AST  31  /  ALT  39  /  AlkPhos  137<H>  12-11    PT/INR - ( 10 Dec 2023 14:45 )   PT: 13.2 sec;   INR: 1.16 ratio         PTT - ( 10 Dec 2023 14:45 )  PTT:31.2 sec  Urinalysis Basic - ( 11 Dec 2023 05:40 )    Color: x / Appearance: x / SG: x / pH: x  Gluc: 113 mg/dL / Ketone: x  / Bili: x / Urobili: x   Blood: x / Protein: x / Nitrite: x   Leuk Esterase: x / RBC: x / WBC x   Sq Epi: x / Non Sq Epi: x / Bacteria: x      CAPILLARY BLOOD GLUCOSE          Troponins:  ProBNP:  Lipid Profile:   HgA1c:  TSH:           RADIOLOGY & ADDITIONAL TESTS:    Imaging Personally Reviewed:  [ ] YES  [ ] NO      Consultant(s) Notes Reviewed:  [x ] YES  [ ] NO    Care Discussed with Consultants/Other Providers [ x] YES  [ ] NO          PAST MEDICAL & SURGICAL HISTORY:  CAD (coronary artery disease)      HTN (hypertension)            Pneumonia due to infectious organism    Handoff    MEWS Score    CAD (coronary artery disease)    HTN (hypertension)    Pneumonia    Pneumonia    HTN (hypertension)    Prophylactic measure    Dyspnea    Acute UTI    Chest pain    W/DIFF BREATHING,CHEST PAIN    Hypoxia    Fever    [email or web address]             MARILEE ESPINOZA  MR# [id number]  83yFemale        Patient is a 83y old  Female who presents with a chief complaint of sepsis 2/2 PNA and UTI (11 Dec 2023 11:51)      INTERVAL HPI/OVERNIGHT EVENTS:  Patient seen and examined at bedside. No notations of chest pain, palpitation, SOB, orthopnea, nausea, vomiting or abdominal pain.    ALLERGIES  No Known Allergies      MEDICATIONS  acetaminophen     Tablet .. 650 milliGRAM(s) Oral every 6 hours PRN Temp greater or equal to 38C (100.4F), Mild Pain (1 - 3)  albuterol/ipratropium for Nebulization 3 milliLiter(s) Nebulizer every 6 hours  azithromycin  IVPB 500 milliGRAM(s) IV Intermittent every 24 hours  benzocaine/menthol Lozenge 1 Lozenge Oral every 4 hours PRN Sore Throat  benzonatate 100 milliGRAM(s) Oral three times a day  cefTRIAXone   IVPB 1000 milliGRAM(s) IV Intermittent every 24 hours  enoxaparin Injectable 40 milliGRAM(s) SubCutaneous every 24 hours  guaiFENesin Oral Liquid (Sugar-Free) 100 milliGRAM(s) Oral every 6 hours PRN Cough              REVIEW OF SYSTEMS:  CONSTITUTIONAL: No fever, weight loss, or fatigue  EYES: No eye pain, visual disturbances, or discharge  ENT:  No difficulty hearing, tinnitus, vertigo; No sinus or throat pain  NECK: No pain or stiffness  RESPIRATORY: No cough, wheezing, chills or hemoptysis; No Shortness of Breath  CARDIOVASCULAR: No chest pain, palpitations, passing out, dizziness, or leg swelling  GASTROINTESTINAL: No abdominal or epigastric pain. No nausea, vomiting, or hematemesis; No diarrhea or constipation. No melena or hematochezia.  GENITOURINARY: No dysuria, frequency, hematuria, or incontinence  NEUROLOGICAL: No headaches, memory loss, loss of strength, numbness, or tremors  SKIN: No itching, burning, rashes, or lesions   LYMPH Nodes: No enlarged glands  ENDOCRINE: No heat or cold intolerance; No hair loss  MUSCULOSKELETAL: No joint pain or swelling; No muscle, back, or extremity pain  PSYCHIATRIC: No depression, anxiety, mood swings, or difficulty sleeping  HEME/LYMPH: No easy bruising, or bleeding gums  ALLERGY AND IMMUNOLOGIC: No hives or eczema	    [ ] All others negative	  [ ] Unable to obtain      T(C): 36.7 (12-11-23 @ 11:39), Max: 39 (12-10-23 @ 12:56)  T(F): 98 (12-11-23 @ 11:39), Max: 102.2 (12-10-23 @ 12:56)  HR: 74 (12-11-23 @ 11:39) (71 - 102)  BP: 97/60 (12-11-23 @ 11:39) (96/58 - 122/70)  RR: 20 (12-11-23 @ 11:39) (20 - 24)  SpO2: 96% (12-11-23 @ 11:39) (89% - 99%)  Wt(kg): --    Weight (kg): 68.9 (12-10 @ 12:56)  I&O's Summary        PHYSICAL EXAM:  A X O x  HEAD:  Atraumatic, Normocephalic  EYES: EOMI, PERRLA, conjunctiva and sclera clear  NECK: Supple, No JVD, Normal thyroid  Resp: CTAB, No crackles, wheezing,   CVS: Regular rate and rhythm; No discernable murmurs, rubs, or gallops  ABD: Soft, Nontender, Nondistended; Bowel sounds present  EXTREMITIES:  2+ Peripheral Pulses, No edema  LYMPH: No dicernable lymphadenopathy noted  GENERAL: NAD, well-groomed, well-developed      LABS:                        9.3    9.66  )-----------( 306      ( 11 Dec 2023 05:40 )             28.5     12-11    135  |  105  |  13  ----------------------------<  113<H>  3.3<L>   |  23  |  0.83    Ca    8.3<L>      11 Dec 2023 05:40  Phos  2.3     12-11  Mg     1.7     12-11    TPro  6.6  /  Alb  2.2<L>  /  TBili  0.9  /  DBili  x   /  AST  31  /  ALT  39  /  AlkPhos  137<H>  12-11    PT/INR - ( 10 Dec 2023 14:45 )   PT: 13.2 sec;   INR: 1.16 ratio         PTT - ( 10 Dec 2023 14:45 )  PTT:31.2 sec  Urinalysis Basic - ( 11 Dec 2023 05:40 )    Color: x / Appearance: x / SG: x / pH: x  Gluc: 113 mg/dL / Ketone: x  / Bili: x / Urobili: x   Blood: x / Protein: x / Nitrite: x   Leuk Esterase: x / RBC: x / WBC x   Sq Epi: x / Non Sq Epi: x / Bacteria: x      CAPILLARY BLOOD GLUCOSE          Troponins:  ProBNP:  Lipid Profile:   HgA1c:  TSH:           RADIOLOGY & ADDITIONAL TESTS:    Imaging Personally Reviewed:  [ ] YES  [ ] NO      Consultant(s) Notes Reviewed:  [x ] YES  [ ] NO    Care Discussed with Consultants/Other Providers [ x] YES  [ ] NO          PAST MEDICAL & SURGICAL HISTORY:  CAD (coronary artery disease)      HTN (hypertension)            Pneumonia due to infectious organism    Handoff    MEWS Score    CAD (coronary artery disease)    HTN (hypertension)    Pneumonia    Pneumonia    HTN (hypertension)    Prophylactic measure    Dyspnea    Acute UTI    Chest pain    W/DIFF BREATHING,CHEST PAIN    Hypoxia    Fever    [email or web address]

## 2023-12-11 NOTE — PROGRESS NOTE ADULT - SUBJECTIVE AND OBJECTIVE BOX
NP Note discussed with  primary attending    Patient is a 83y old  Female who presents with a chief complaint of sepsis 2/2 PNA and UTI (11 Dec 2023 12:32)      INTERVAL HPI/OVERNIGHT EVENTS: no new complaints, pt seen at bedside with pneumonia symptoms. Productive coughing with wheezing     MEDICATIONS  (STANDING):  albuterol/ipratropium for Nebulization 3 milliLiter(s) Nebulizer every 6 hours  azithromycin  IVPB 500 milliGRAM(s) IV Intermittent every 24 hours  benzonatate 100 milliGRAM(s) Oral three times a day  cefTRIAXone   IVPB 1000 milliGRAM(s) IV Intermittent every 24 hours  enoxaparin Injectable 40 milliGRAM(s) SubCutaneous every 24 hours  influenza  Vaccine (HIGH DOSE) 0.7 milliLiter(s) IntraMuscular once    MEDICATIONS  (PRN):  acetaminophen     Tablet .. 650 milliGRAM(s) Oral every 6 hours PRN Temp greater or equal to 38C (100.4F), Mild Pain (1 - 3)  benzocaine/menthol Lozenge 1 Lozenge Oral every 4 hours PRN Sore Throat  guaiFENesin Oral Liquid (Sugar-Free) 100 milliGRAM(s) Oral every 6 hours PRN Cough      __________________________________________________  REVIEW OF SYSTEMS:    CONSTITUTIONAL: No fever,   EYES: no acute visual disturbances  NECK: No pain or stiffness  RESPIRATORY:  wet cough  CARDIOVASCULAR: No chest pain, no palpitations  GASTROINTESTINAL: No pain. No nausea or vomiting; No diarrhea   NEUROLOGICAL: No headache or numbness, no tremors  MUSCULOSKELETAL: No joint pain, no muscle pain  GENITOURINARY: no dysuria, no frequency, no hesitancy  PSYCHIATRY: no depression , no anxiety  ALL OTHER  ROS negative        Vital Signs Last 24 Hrs  T(C): 36.2 (11 Dec 2023 12:58), Max: 38.9 (10 Dec 2023 22:55)  T(F): 97.2 (11 Dec 2023 12:58), Max: 102.1 (10 Dec 2023 22:55)  HR: 89 (11 Dec 2023 12:58) (71 - 94)  BP: 99/59 (11 Dec 2023 12:58) (96/58 - 112/69)  BP(mean): 73 (11 Dec 2023 11:39) (73 - 82)  RR: 19 (11 Dec 2023 12:58) (19 - 20)  SpO2: 94% (11 Dec 2023 12:58) (90% - 99%)    Parameters below as of 11 Dec 2023 12:58  Patient On (Oxygen Delivery Method): nasal cannula  O2 Flow (L/min): 2      ________________________________________________  PHYSICAL EXAM:  GENERAL: NAD  HEENT: Normocephalic;  conjunctivae and sclerae clear; dry mucous membranes;   NECK : supple  CHEST/LUNG: diminished with auscultation, productive coughing with wheezing   HEART: S1 S2  regular; no murmurs, gallops or rubs  ABDOMEN: Soft, Nontender, Nondistended; Bowel sounds present  EXTREMITIES: no cyanosis; no edema; no calf tenderness  SKIN: warm and dry; no rash  NERVOUS SYSTEM:  Awake and alert; Oriented  to place, person and time ; no new deficits    _________________________________________________  LABS:                        9.3    9.66  )-----------( 306      ( 11 Dec 2023 05:40 )             28.5     12-11    135  |  105  |  13  ----------------------------<  113<H>  3.3<L>   |  23  |  0.83    Ca    8.3<L>      11 Dec 2023 05:40  Phos  2.3     12-11  Mg     1.7     12-11    TPro  6.6  /  Alb  2.2<L>  /  TBili  0.9  /  DBili  x   /  AST  31  /  ALT  39  /  AlkPhos  137<H>  12-11    PT/INR - ( 10 Dec 2023 14:45 )   PT: 13.2 sec;   INR: 1.16 ratio         PTT - ( 10 Dec 2023 14:45 )  PTT:31.2 sec  Urinalysis Basic - ( 11 Dec 2023 05:40 )    Color: x / Appearance: x / SG: x / pH: x  Gluc: 113 mg/dL / Ketone: x  / Bili: x / Urobili: x   Blood: x / Protein: x / Nitrite: x   Leuk Esterase: x / RBC: x / WBC x   Sq Epi: x / Non Sq Epi: x / Bacteria: x      CAPILLARY BLOOD GLUCOSE            RADIOLOGY & ADDITIONAL TESTS:    < from: CT Angio Chest PE Protocol w/ IV Cont (12.11.23 @ 00:34) >  FINDINGS:    LUNGS AND AIRWAYS: Patent central airways.  Extensive multifocal   predominantly peripheral consolidative opacities of the upper and lower   lobes with confluent consolidations in the dependent lower lobes   bilaterally.  PLEURA: No pleural effusion.  MEDIASTINUM AND PONCHO: No lymphadenopathy.  VESSELS: No acute pulmonary embolism to the subsegmental level.   Atherosclerotic changes of the aorta and coronaries. Mildly dilated   ascending aorta measuring up to 3.5 cm. No aortic dissection.  HEART: Heart size is normal. No pericardial effusion.  CHEST WALL AND LOWER NECK: Within normal limits.  VISUALIZED UPPER ABDOMEN: Within normal limits.  BONES: Degenerative changes.    IMPRESSION:  No acute pulmonary embolism.    Extensive multifocal consolidations bilaterally concerning for multifocal   pneumonia.    < end of copied text >      Imaging Personally Reviewed:  YES/NO    Consultant(s) Notes Reviewed:   YES/ No    Care Discussed with Consultants :     Plan of care was discussed with patient and /or primary care giver; all questions and concerns were addressed and care was aligned with patient's wishes.

## 2023-12-11 NOTE — PATIENT PROFILE ADULT - NSPROPTRIGHTSUPPORTNAME_GEN_A_NUR
Yes
You can access the FollowMyHealth Patient Portal offered by [facility] by registering at the following website: [email or web address]. By joining [facility]’s FollowMyHealth portal, you will also be able to view your health information using other applications (apps) compatible with our system.
Chantale Overton

## 2023-12-11 NOTE — CONSULT NOTE ADULT - ASSESSMENT
75 yrs old F, from home, ambulating with walker, pmhx of CAD, ?MI 7 yrs ago, presented with cough, fever, chills, urinary symptoms, and myalgia,multifocal pneumonia,UTI.  1.Multifocal pneumonia,UTI-ABX.  2.Echocardiogram.  3.CAD-asa,statin.  4.GI and DVT prophylaxis.

## 2023-12-11 NOTE — PROGRESS NOTE ADULT - PROBLEM SELECTOR PLAN 5
chest pain associated with cough  trop x2 neg   EKG NSR  murmur noted on exam  f/u Echo  Cardio Dr. Oviedo consulted chest pain associated with cough  trop x2 neg   EKG NSR  murmur noted on exam  f/u Echo  Cardio Dr. Oviedo

## 2023-12-11 NOTE — CONSULT NOTE ADULT - SUBJECTIVE AND OBJECTIVE BOX
Time of visit:    CHIEF COMPLAINT: Patient is a 83y old  Female who presents with a chief complaint of sepsis 2/2 PNA and UTI (11 Dec 2023 12:32)      HPI:  75 yrs old F, from home, ambulating with walker, pmhx of CAD, ?MI 7 yrs ago, presented with cough, fever, chills, urinary symptoms, and myalgia. Pt stated that for the past 6 months, she had been having cough, which has worsened for the past 10 days, along with fever and chills of 2 days duration. Pt endorsed dysuria mildly improved along with urinary frequency and urgency without any blood in urine. Pt reported of having chest pain after cough has started, central pressure like without radiation to the arm, neck, jaw, intermittent, associated with cough, denied palpitation abdominal pain, N/V/D, swelling of the legs, headache, dizziness or blurring of vision.   Pt had recently traveled from [place] about 2 weeks ago, she was diagnosed with PNA and completed course of Abx at that time. Pt endorsed being exposed to TB [her mother] in the past however did not have any personal history of TB, denied sick contacts.   Pt and family at bedside and over the phone, reported of history of MI about 7 yrs ago however no stent placement or other interventions done, denied other hx of cardiac diseases, and last echo was done about 2 yrs ago.  (10 Dec 2023 19:03)   Patient seen and examined.     PAST MEDICAL & SURGICAL HISTORY:  CAD (coronary artery disease)      HTN (hypertension)          Allergies    No Known Allergies    Intolerances        MEDICATIONS  (STANDING):  albuterol/ipratropium for Nebulization 3 milliLiter(s) Nebulizer every 6 hours  azithromycin  IVPB 500 milliGRAM(s) IV Intermittent every 24 hours  benzonatate 100 milliGRAM(s) Oral three times a day  cefTRIAXone   IVPB 1000 milliGRAM(s) IV Intermittent every 24 hours  enoxaparin Injectable 40 milliGRAM(s) SubCutaneous every 24 hours  influenza  Vaccine (HIGH DOSE) 0.7 milliLiter(s) IntraMuscular once      MEDICATIONS  (PRN):  acetaminophen     Tablet .. 650 milliGRAM(s) Oral every 6 hours PRN Temp greater or equal to 38C (100.4F), Mild Pain (1 - 3)  benzocaine/menthol Lozenge 1 Lozenge Oral every 4 hours PRN Sore Throat  guaiFENesin Oral Liquid (Sugar-Free) 100 milliGRAM(s) Oral every 6 hours PRN Cough   Medications up to date at time of exam.    Medications up to date at time of exam.    FAMILY HISTORY:      SOCIAL HISTORY: Primary language Maltese , basic English. daughter at bedside translated .     Smoking History: Denies smoking/smoke exposure, [   ] former smoker  Living Condition: [   ] apartment, [   ] private house  Travel History: denies recent travel  Illicit Substance Use: denies  Alcohol Use: denies    REVIEW OF SYSTEMS:    CONSTITUTIONAL:  Presented with  fevers, chills. Denies night sweats.    HEENT:  Denies blurred vision. No sore throat nor runny nose.    CARDIOVASCULAR:  Presented with Chest pain , non radiating type.     RESPIRATORY:  Verbalized of episodic dyspnea . Non productive  cough. No wheezing on exam.     GASTROINTESTINAL:  Denies abdominal pain. No vomiting nor diarrhea.    GENITOURINARY: Verbalized of episode of urinary frequency. No hematuria.     NEUROLOGIC:  No tremors. No seizures on exam.     PSYCHIATRIC:  No emotional distress .     PHYSICAL EXAMINATION:    Vital Signs Last 24 Hrs  T(C): 36.2 (11 Dec 2023 12:58), Max: 38.9 (10 Dec 2023 22:55)  T(F): 97.2 (11 Dec 2023 12:58), Max: 102.1 (10 Dec 2023 22:55)  HR: 89 (11 Dec 2023 12:58) (71 - 98)  BP: 99/59 (11 Dec 2023 12:58) (96/58 - 112/69)  BP(mean): 73 (11 Dec 2023 11:39) (73 - 82)  RR: 19 (11 Dec 2023 12:58) (19 - 20)  SpO2: 94% (11 Dec 2023 12:58) (90% - 99%)    Parameters below as of 11 Dec 2023 12:58  Patient On (Oxygen Delivery Method): nasal cannula  O2 Flow (L/min): 2     (if applicable)    General : Alert and oriented . No acute distress .     HEENT: Head is normocephalic and atraumatic. No nasal tenderness. Extraocular muscles are intact. Mucous membranes are moist.     NECK: Supple, no palpable adenopathy.    LUNGS: Non labored. No wheezing. No use of accessory muscle.     HEART: S1 S2 Regular rate and no click / rub.     ABDOMEN: Soft, nontender, and nondistended.  Active bowel sounds.     ; No bladder distention and tenderness.     NEUROLOGIC: Awake, alert, oriented.     SKIN: Warm and moist . Non diaphoretic.       LABS:                        9.3    9.66  )-----------( 306      ( 11 Dec 2023 05:40 )             28.5     12-11    135  |  105  |  13  ----------------------------<  113<H>  3.3<L>   |  23  |  0.83    Ca    8.3<L>      11 Dec 2023 05:40  Phos  2.3     12-11  Mg     1.7     12-11    TPro  6.6  /  Alb  2.2<L>  /  TBili  0.9  /  DBili  x   /  AST  31  /  ALT  39  /  AlkPhos  137<H>  12-11    PT/INR - ( 10 Dec 2023 14:45 )   PT: 13.2 sec;   INR: 1.16 ratio         PTT - ( 10 Dec 2023 14:45 )  PTT:31.2 sec  Urinalysis Basic - ( 11 Dec 2023 05:40 )    Color: x / Appearance: x / SG: x / pH: x  Gluc: 113 mg/dL / Ketone: x  / Bili: x / Urobili: x   Blood: x / Protein: x / Nitrite: x   Leuk Esterase: x / RBC: x / WBC x   Sq Epi: x / Non Sq Epi: x / Bacteria: x        MICROBIOLOGY: (if applicable)    RADIOLOGY & ADDITIONAL STUDIES:  CT Chest : < from: CT Angio Chest PE Protocol w/ IV Cont (12.11.23 @ 00:34) >    ACC: [id number] EXAM:  CT ANGIO CHEST PULM [facility]   ORDERED BY: FARRAH BORREGO     PROCEDURE DATE:  12/11/2023          INTERPRETATION:  CLINICAL INFORMATION: Dyspnea, recent long flight    COMPARISON: None.    CONTRAST/COMPLICATIONS:  IV Contrast:Omnipaque 350  90 cc administered   10 cc discarded  Oral Contrast: NONE  Complications: None reported at time of study completion    PROCEDURE:  CT Angiography of the Chest.  Sagittal and coronal reformats were performed as well as 3D (MIP)   reconstructions.    FINDINGS:    LUNGS AND AIRWAYS: Patent central airways.  Extensive multifocal   predominantly peripheral consolidative opacities of the upper and lower   lobes with confluent consolidations in the dependent lower lobes   bilaterally.  PLEURA: No pleural effusion.  MEDIASTINUM AND PONCHO: No lymphadenopathy.  VESSELS: No acute pulmonary embolism to the subsegmental level.   Atherosclerotic changes of the aorta and coronaries. Mildly dilated   ascending aorta measuring up to 3.5 cm. No aortic dissection.  HEART: Heart size is normal. No pericardial effusion.  CHEST WALL AND LOWER NECK: Within normal limits.  VISUALIZED UPPER ABDOMEN: Within normal limits.  BONES: Degenerative changes.    IMPRESSION:  No acute pulmonary embolism.    Extensive multifocal consolidations bilaterally concerning for multifocal   pneumonia.        --- End of Report ---            RUPA NASCIMENTO MD; Attending Radiologist  This document has been electronically signed. Dec 11 2023  3:15AM    < end of copied text >     CXR:< from: Xray Chest 1 View- PORTABLE-Urgent (12.10.23 @ 14:06) >    ACC: [id number] EXAM:  XR CHEST PORTABLE URGENT 1V   ORDERED BY:  CORAL GODWIN     PROCEDURE DATE:  12/10/2023          INTERPRETATION:  AP chest on December 10, 2023 at 1:53 PM. Patient has   cough with fever and hypoxia.    COMPARISON:None available.    Heart magnified by technique.    There are mild bibasilar infiltrates left greater than right.    Another irregular infiltrate is suspect off the right upper hilum.    IMPRESSION: Bilateral lung infiltrates as above.    --- End of Report ---            CLIVE STALLWORTH MD; Attending Radiologist  This document has been electronically signed. Dec 10 2023  4:56PM    < end of copied text >    ECHO:    IMPRESSION: 83y Female PAST MEDICAL & SURGICAL HISTORY:  CAD (coronary artery disease)      HTN (hypertension)    Impression: This is an 82 Y/O Female presented to ED with fever, chills, cough , urinary frequency and urgency and myalgia . Had been coughing for 6 Months which has worsened for past 10 Days. Recently traveled from [place] about 2 weeks ago which she was diagnosed with Pneumonia which she completed antibiotic at that time. For pulmonary evaluation due to  Acute hypoxic respiratory failure secondary to Multifocal Pneumonia , showing on CXR , CTPA . No PE. Pt endorsed being exposed to TB [her mother] in the past however did not have any personal history of TB.       Suggestion:  O2 saturation 94% with O2 supplement. Continue Oxygen supplementation 2L NC. Monitor O2 saturation trend.   Continue Duoneb via nebulization Q 6 Hours .  Benzonatate  100 mg 3x daily.  GuaiFENesin  PRN 3x Daily.  On Azithromycin 500 mg IVPB Daily.  On Ceftriaxone 1 Gm IVPB Daiy.       DVT / GI prophylactic : On Lovenox 40 mg SQ Daily.    Quantiferon test.  Blood Cx , Sputum Cx, Legionella UA .      Time of visit:    CHIEF COMPLAINT: Patient is a 83y old  Female who presents with a chief complaint of sepsis 2/2 PNA and UTI (11 Dec 2023 12:32)      HPI:  75 yrs old F, from home, ambulating with walker, pmhx of CAD, ?MI 7 yrs ago, presented with cough, fever, chills, urinary symptoms, and myalgia. Pt stated that for the past 6 months, she had been having cough, which has worsened for the past 10 days, along with fever and chills of 2 days duration. Pt endorsed dysuria mildly improved along with urinary frequency and urgency without any blood in urine. Pt reported of having chest pain after cough has started, central pressure like without radiation to the arm, neck, jaw, intermittent, associated with cough, denied palpitation abdominal pain, N/V/D, swelling of the legs, headache, dizziness or blurring of vision.   Pt had recently traveled from [place] about 2 weeks ago, she was diagnosed with PNA and completed course of Abx at that time. Pt endorsed being exposed to TB [her mother] in the past however did not have any personal history of TB, denied sick contacts.   Pt and family at bedside and over the phone, reported of history of MI about 7 yrs ago however no stent placement or other interventions done, denied other hx of cardiac diseases, and last echo was done about 2 yrs ago.  (10 Dec 2023 19:03)   Patient seen and examined.     PAST MEDICAL & SURGICAL HISTORY:  CAD (coronary artery disease)      HTN (hypertension)          Allergies    No Known Allergies    Intolerances        MEDICATIONS  (STANDING):  albuterol/ipratropium for Nebulization 3 milliLiter(s) Nebulizer every 6 hours  azithromycin  IVPB 500 milliGRAM(s) IV Intermittent every 24 hours  benzonatate 100 milliGRAM(s) Oral three times a day  cefTRIAXone   IVPB 1000 milliGRAM(s) IV Intermittent every 24 hours  enoxaparin Injectable 40 milliGRAM(s) SubCutaneous every 24 hours  influenza  Vaccine (HIGH DOSE) 0.7 milliLiter(s) IntraMuscular once      MEDICATIONS  (PRN):  acetaminophen     Tablet .. 650 milliGRAM(s) Oral every 6 hours PRN Temp greater or equal to 38C (100.4F), Mild Pain (1 - 3)  benzocaine/menthol Lozenge 1 Lozenge Oral every 4 hours PRN Sore Throat  guaiFENesin Oral Liquid (Sugar-Free) 100 milliGRAM(s) Oral every 6 hours PRN Cough   Medications up to date at time of exam.    Medications up to date at time of exam.    FAMILY HISTORY:      SOCIAL HISTORY: Primary language Nepalese , basic English. daughter at bedside translated .     Smoking History: Denies smoking/smoke exposure, [   ] former smoker  Living Condition: [   ] apartment, [   ] private house  Travel History: denies recent travel  Illicit Substance Use: denies  Alcohol Use: denies    REVIEW OF SYSTEMS:    CONSTITUTIONAL:  Presented with  fevers, chills. Denies night sweats.    HEENT:  Denies blurred vision. No sore throat nor runny nose.    CARDIOVASCULAR:  Presented with Chest pain , non radiating type.     RESPIRATORY:  Verbalized of episodic dyspnea . Non productive  cough. No wheezing on exam.     GASTROINTESTINAL:  Denies abdominal pain. No vomiting nor diarrhea.    GENITOURINARY: Verbalized of episode of urinary frequency. No hematuria.     NEUROLOGIC:  No tremors. No seizures on exam.     PSYCHIATRIC:  No emotional distress .     PHYSICAL EXAMINATION:    Vital Signs Last 24 Hrs  T(C): 36.2 (11 Dec 2023 12:58), Max: 38.9 (10 Dec 2023 22:55)  T(F): 97.2 (11 Dec 2023 12:58), Max: 102.1 (10 Dec 2023 22:55)  HR: 89 (11 Dec 2023 12:58) (71 - 98)  BP: 99/59 (11 Dec 2023 12:58) (96/58 - 112/69)  BP(mean): 73 (11 Dec 2023 11:39) (73 - 82)  RR: 19 (11 Dec 2023 12:58) (19 - 20)  SpO2: 94% (11 Dec 2023 12:58) (90% - 99%)    Parameters below as of 11 Dec 2023 12:58  Patient On (Oxygen Delivery Method): nasal cannula  O2 Flow (L/min): 2     (if applicable)    General : Alert and oriented . No acute distress .     HEENT: Head is normocephalic and atraumatic. No nasal tenderness. Extraocular muscles are intact. Mucous membranes are moist.     NECK: Supple, no palpable adenopathy.    LUNGS: Non labored. No wheezing. No use of accessory muscle.     HEART: S1 S2 Regular rate and no click / rub.     ABDOMEN: Soft, nontender, and nondistended.  Active bowel sounds.     ; No bladder distention and tenderness.     NEUROLOGIC: Awake, alert, oriented.     SKIN: Warm and moist . Non diaphoretic.       LABS:                        9.3    9.66  )-----------( 306      ( 11 Dec 2023 05:40 )             28.5     12-11    135  |  105  |  13  ----------------------------<  113<H>  3.3<L>   |  23  |  0.83    Ca    8.3<L>      11 Dec 2023 05:40  Phos  2.3     12-11  Mg     1.7     12-11    TPro  6.6  /  Alb  2.2<L>  /  TBili  0.9  /  DBili  x   /  AST  31  /  ALT  39  /  AlkPhos  137<H>  12-11    PT/INR - ( 10 Dec 2023 14:45 )   PT: 13.2 sec;   INR: 1.16 ratio         PTT - ( 10 Dec 2023 14:45 )  PTT:31.2 sec  Urinalysis Basic - ( 11 Dec 2023 05:40 )    Color: x / Appearance: x / SG: x / pH: x  Gluc: 113 mg/dL / Ketone: x  / Bili: x / Urobili: x   Blood: x / Protein: x / Nitrite: x   Leuk Esterase: x / RBC: x / WBC x   Sq Epi: x / Non Sq Epi: x / Bacteria: x        MICROBIOLOGY: (if applicable)    RADIOLOGY & ADDITIONAL STUDIES:  CT Chest : < from: CT Angio Chest PE Protocol w/ IV Cont (12.11.23 @ 00:34) >    ACC: [id number] EXAM:  CT ANGIO CHEST PULM [facility]   ORDERED BY: FARRAH BORREGO     PROCEDURE DATE:  12/11/2023          INTERPRETATION:  CLINICAL INFORMATION: Dyspnea, recent long flight    COMPARISON: None.    CONTRAST/COMPLICATIONS:  IV Contrast:Omnipaque 350  90 cc administered   10 cc discarded  Oral Contrast: NONE  Complications: None reported at time of study completion    PROCEDURE:  CT Angiography of the Chest.  Sagittal and coronal reformats were performed as well as 3D (MIP)   reconstructions.    FINDINGS:    LUNGS AND AIRWAYS: Patent central airways.  Extensive multifocal   predominantly peripheral consolidative opacities of the upper and lower   lobes with confluent consolidations in the dependent lower lobes   bilaterally.  PLEURA: No pleural effusion.  MEDIASTINUM AND PONCHO: No lymphadenopathy.  VESSELS: No acute pulmonary embolism to the subsegmental level.   Atherosclerotic changes of the aorta and coronaries. Mildly dilated   ascending aorta measuring up to 3.5 cm. No aortic dissection.  HEART: Heart size is normal. No pericardial effusion.  CHEST WALL AND LOWER NECK: Within normal limits.  VISUALIZED UPPER ABDOMEN: Within normal limits.  BONES: Degenerative changes.    IMPRESSION:  No acute pulmonary embolism.    Extensive multifocal consolidations bilaterally concerning for multifocal   pneumonia.        --- End of Report ---            RUPA NASCIMENTO MD; Attending Radiologist  This document has been electronically signed. Dec 11 2023  3:15AM    < end of copied text >     CXR:< from: Xray Chest 1 View- PORTABLE-Urgent (12.10.23 @ 14:06) >    ACC: [id number] EXAM:  XR CHEST PORTABLE URGENT 1V   ORDERED BY:  CORAL GODWIN     PROCEDURE DATE:  12/10/2023          INTERPRETATION:  AP chest on December 10, 2023 at 1:53 PM. Patient has   cough with fever and hypoxia.    COMPARISON:None available.    Heart magnified by technique.    There are mild bibasilar infiltrates left greater than right.    Another irregular infiltrate is suspect off the right upper hilum.    IMPRESSION: Bilateral lung infiltrates as above.    --- End of Report ---            CLIVE STALLWORTH MD; Attending Radiologist  This document has been electronically signed. Dec 10 2023  4:56PM    < end of copied text >    ECHO:    IMPRESSION: 83y Female PAST MEDICAL & SURGICAL HISTORY:  CAD (coronary artery disease)      HTN (hypertension)    Impression: This is an 84 Y/O Female presented to ED with fever, chills, cough , urinary frequency and urgency and myalgia . Had been coughing for 6 Months which has worsened for past 10 Days. Recently traveled from [place] about 2 weeks ago which she was diagnosed with Pneumonia which she completed antibiotic at that time. For pulmonary evaluation due to  Acute hypoxic respiratory failure secondary to Multifocal Pneumonia , showing on CXR , CTPA . No PE. Pt endorsed being exposed to TB [her mother] in the past however did not have any personal history of TB.       Suggestion:  O2 saturation 94% with O2 supplement. Continue Oxygen supplementation 2L NC. Monitor O2 saturation trend.   Continue Duoneb via nebulization Q 6 Hours .  Benzonatate  100 mg 3x daily.  GuaiFENesin  PRN 3x Daily.  On Azithromycin 500 mg IVPB Daily.  On Ceftriaxone 1 Gm IVPB Daiy.       DVT / GI prophylactic : On Lovenox 40 mg SQ Daily.    Quantiferon test.  Blood Cx , Sputum Cx, Legionella UA .

## 2023-12-11 NOTE — PATIENT PROFILE ADULT - PRO INTERPRETER NEED 2
[place] Cheek-To-Nose Interpolation Flap Text: A decision was made to reconstruct the defect utilizing an interpolation axial flap and a staged reconstruction.  A telfa template was made of the defect.  This telfa template was then used to outline the Cheek-To-Nose Interpolation flap.  The donor area for the pedicle flap was then injected with anesthesia.  The flap was excised through the skin and subcutaneous tissue down to the layer of the underlying musculature.  The interpolation flap was carefully excised within this deep plane to maintain its blood supply.  The edges of the donor site were undermined.   The donor site was closed in a primary fashion.  The pedicle was then rotated into position and sutured.  Once the tube was sutured into place, adequate blood supply was confirmed with blanching and refill.  The pedicle was then wrapped with xeroform gauze and dressed appropriately with a telfa and gauze bandage to ensure continued blood supply and protect the attached pedicle.

## 2023-12-11 NOTE — PROGRESS NOTE ADULT - PROBLEM SELECTOR PLAN 1
p/w cough, fever, chills, dyspnea   CXR: Bilateral lunf infiltrates, irregular infiltrate of the right upper hilum   RVP panel neg   c/w Azithro and Rocephin   duoneb nebulizers   tesselon pearls   guaifenesin oral syrup  cepacol lozenges   f/u quantiferon gold test   f/u blood cultures, sputum culture, legionella, and strep pneumo Ag urine  Dr. Daly Pulm consulted p/w cough, fever, chills, dyspnea   CXR: Bilateral lunf infiltrates, irregular infiltrate of the right upper hilum   RVP panel neg   c/w Azithro and Rocephin   duoneb nebulizers   tesselon pearls   guaifenesin oral syrup  cepacol lozenges   f/u quantiferon gold test   f/u blood cultures, sputum culture, legionella, and strep pneumo Ag urine  maintain 02 sat 93-95%  Dr. Daly Pulm

## 2023-12-11 NOTE — CONSULT NOTE ADULT - SUBJECTIVE AND OBJECTIVE BOX
Date of Service  12-11-23 @ 11:54    CHIEF COMPLAINT:Patient is a 83y old  Female who presents with a chief complaint of sepsis 2/2 PNA and UTI .      HPI:  75 yrs old F, from home, ambulating with walker, pmhx of CAD, ?MI 7 yrs ago, presented with cough, fever, chills, urinary symptoms, and myalgia. Pt stated that for the past 6 months, she had been having cough, which has worsened for the past 10 days, along with fever and chills of 2 days duration. Pt endorsed dysuria mildly improved along with urinary frequency and urgency without any blood in urine. Pt reported of having chest pain after cough has started, central pressure like without radiation to the arm, neck, jaw, intermittent, associated with cough, denied palpitation abdominal pain, N/V/D, swelling of the legs, headache, dizziness or blurring of vision.   Pt had recently traveled from [place] about 2 weeks ago, she was diagnosed with PNA and completed course of Abx at that time. Pt endorsed being exposed to TB [her mother] in the past however did not have any personal history of TB, denied sick contacts.   Pt and family at bedside and over the phone, reported of history of MI about 7 yrs ago however no stent placement or other interventions done, denied other hx of cardiac diseases, and last echo was done about 2 yrs ago.  (10 Dec 2023 19:03)      PAST MEDICAL & SURGICAL HISTORY:  CAD (coronary artery disease)      HTN (hypertension)          MEDICATIONS  (STANDING):  albuterol/ipratropium for Nebulization 3 milliLiter(s) Nebulizer every 6 hours  azithromycin  IVPB 500 milliGRAM(s) IV Intermittent every 24 hours  benzonatate 100 milliGRAM(s) Oral three times a day  cefTRIAXone   IVPB 1000 milliGRAM(s) IV Intermittent every 24 hours  enoxaparin Injectable 40 milliGRAM(s) SubCutaneous every 24 hours    MEDICATIONS  (PRN):  acetaminophen     Tablet .. 650 milliGRAM(s) Oral every 6 hours PRN Temp greater or equal to 38C (100.4F), Mild Pain (1 - 3)  benzocaine/menthol Lozenge 1 Lozenge Oral every 4 hours PRN Sore Throat  guaiFENesin Oral Liquid (Sugar-Free) 100 milliGRAM(s) Oral every 6 hours PRN Cough      FAMILY HISTORY:No hx of CAD      SOCIAL HISTORY:    [ x] Non-smoker    [x ] Alcohol-denies    Allergies    No Known Allergies    Intolerances    	    REVIEW OF SYSTEMS:  CONSTITUTIONAL: No fever, weight loss, or fatigue  EYES: No eye pain, visual disturbances, or discharge  ENT:  No difficulty hearing, tinnitus, vertigo; No sinus or throat pain  NECK: No pain or stiffness  RESPIRATORY: No cough, wheezing, chills or hemoptysis; + Shortness of Breath  CARDIOVASCULAR: No chest pain, palpitations, passing out, dizziness, or leg swelling  GASTROINTESTINAL: No abdominal or epigastric pain. No nausea, vomiting, or hematemesis; No diarrhea or constipation. No melena or hematochezia.  GENITOURINARY: No dysuria, frequency, hematuria, or incontinence  NEUROLOGICAL: No headaches, memory loss, loss of strength, numbness, or tremors  SKIN: No itching, burning, rashes, or lesions   LYMPH Nodes: No enlarged glands  ENDOCRINE: No heat or cold intolerance; No hair loss  MUSCULOSKELETAL: No joint pain or swelling; No muscle, back, or extremity pain  PSYCHIATRIC: No depression, anxiety, mood swings, or difficulty sleeping  HEME/LYMPH: No easy bruising, or bleeding gums  ALLERGY AND IMMUNOLOGIC: No hives or eczema	      PHYSICAL EXAM:  T(C): 36.7 (12-11-23 @ 11:39), Max: 39 (12-10-23 @ 12:56)  HR: 74 (12-11-23 @ 11:39) (71 - 102)  BP: 97/60 (12-11-23 @ 11:39) (96/58 - 122/70)  RR: 20 (12-11-23 @ 11:39) (20 - 24)  SpO2: 96% (12-11-23 @ 11:39) (89% - 99%)  Wt(kg): --  I&O's Summary      Appearance: Normal	  HEENT:   Normal oral mucosa, PERRL, EOMI	  Lymphatic: No lymphadenopathy  Cardiovascular: Normal S1 S2, No JVD, No murmurs, No edema  Respiratory: B/L ronchi	  Psychiatry: A & O x 3, Mood & affect appropriate  Gastrointestinal:  Soft, Non-tender, + BS	  Skin: No rashes, No ecchymoses, No cyanosis	  Neurologic: Non-focal  Extremities: Normal range of motion, No clubbing, cyanosis or edema  Vascular: Peripheral pulses palpable 2+ bilaterally        ECG:  	nsr,lvh,q inferior    LABS:	 	      Troponin I, High Sensitivity Result: 41.5 ng/L (12-10-23 @ 20:20)  Troponin I, High Sensitivity Result: 38.3 ng/L (12-10-23 @ 14:45)                          9.3    9.66  )-----------( 306      ( 11 Dec 2023 05:40 )             28.5     12-11    135  |  105  |  13  ----------------------------<  113<H>  3.3<L>   |  23  |  0.83    Ca    8.3<L>      11 Dec 2023 05:40  Phos  2.3     12-11  Mg     1.7     12-11    TPro  6.6  /  Alb  2.2<L>  /  TBili  0.9  /  DBili  x   /  AST  31  /  ALT  39  /  AlkPhos  137<H>  12-11    < from: CT Angio Chest PE Protocol w/ IV Cont (12.11.23 @ 00:34) >  ACC: [id number] EXAM:  CT ANGIO CHEST PULM [facility]   ORDERED BY: FARRAH BORREGO     PROCEDURE DATE:  12/11/2023          INTERPRETATION:  CLINICAL INFORMATION: Dyspnea, recent long flight    COMPARISON: None.    CONTRAST/COMPLICATIONS:  IV Contrast:Omnipaque 350  90 cc administered   10 cc discarded  Oral Contrast: NONE  Complications: None reported at time of study completion    PROCEDURE:  CT Angiography of the Chest.  Sagittal and coronal reformats were performed as well as 3D (MIP)   reconstructions.    FINDINGS:    LUNGS AND AIRWAYS: Patent central airways.  Extensive multifocal   predominantly peripheral consolidative opacities of the upper and lower   lobes with confluent consolidations in the dependent lower lobes   bilaterally.  PLEURA: No pleural effusion.  MEDIASTINUM AND PONCHO: No lymphadenopathy.  VESSELS: No acute pulmonary embolism to the subsegmental level.   Atherosclerotic changes of the aorta and coronaries. Mildly dilated   ascending aorta measuring up to 3.5 cm. No aortic dissection.  HEART: Heart size is normal. No pericardial effusion.  CHEST WALL AND LOWER NECK: Within normal limits.  VISUALIZED UPPER ABDOMEN: Within normal limits.  BONES: Degenerative changes.    IMPRESSION:  No acute pulmonary embolism.    Extensive multifocal consolidations bilaterally concerning for multifocal   pneumonia.        --- End of Report ---            RUPA NASCIMENTO MD; Attending Radiologist  This document has been electronically signed. Dec 11 2023  3:15AM    < end of copied text >  < from: Xray Chest 1 View- PORTABLE-Urgent (12.10.23 @ 14:06) >  ACC: [id number] EXAM:  XR CHEST PORTABLE URGENT 1V   ORDERED BY:  CORAL GODWIN     PROCEDURE DATE:  12/10/2023          INTERPRETATION:  AP chest on December 10, 2023 at 1:53 PM. Patient has   cough with fever and hypoxia.    COMPARISON:None available.    Heart magnified by technique.    There are mild bibasilar infiltrates left greater than right.    Another irregular infiltrate is suspect off the right upper hilum.    IMPRESSION: Bilateral lung infiltrates as above.    --- End of Report ---            CLIVE STALLWORTH MD; Attending Radiologist  This document has been electronically signed. Dec 10 2023  4:56PM    < end of copied text >       Date of Service  12-11-23 @ 11:54    CHIEF COMPLAINT:Patient is a 83y old  Female who presents with a chief complaint of sepsis 2/2 PNA and UTI .      HPI:  75 yrs old F, from home, ambulating with walker, pmhx of CAD, ?MI 7 yrs ago, presented with cough, fever, chills, urinary symptoms, and myalgia. Pt stated that for the past 6 months, she had been having cough, which has worsened for the past 10 days, along with fever and chills of 2 days duration. Pt endorsed dysuria mildly improved along with urinary frequency and urgency without any blood in urine. Pt reported of having chest pain after cough has started, central pressure like without radiation to the arm, neck, jaw, intermittent, associated with cough, denied palpitation abdominal pain, N/V/D, swelling of the legs, headache, dizziness or blurring of vision.   Pt had recently traveled from [place] about 2 weeks ago, she was diagnosed with PNA and completed course of Abx at that time. Pt endorsed being exposed to TB [her mother] in the past however did not have any personal history of TB, denied sick contacts.   Pt and family at bedside and over the phone, reported of history of MI about 7 yrs ago however no stent placement or other interventions done, denied other hx of cardiac diseases, and last echo was done about 2 yrs ago.  (10 Dec 2023 19:03)      PAST MEDICAL & SURGICAL HISTORY:  CAD (coronary artery disease)      HTN (hypertension)          MEDICATIONS  (STANDING):  albuterol/ipratropium for Nebulization 3 milliLiter(s) Nebulizer every 6 hours  azithromycin  IVPB 500 milliGRAM(s) IV Intermittent every 24 hours  benzonatate 100 milliGRAM(s) Oral three times a day  cefTRIAXone   IVPB 1000 milliGRAM(s) IV Intermittent every 24 hours  enoxaparin Injectable 40 milliGRAM(s) SubCutaneous every 24 hours    MEDICATIONS  (PRN):  acetaminophen     Tablet .. 650 milliGRAM(s) Oral every 6 hours PRN Temp greater or equal to 38C (100.4F), Mild Pain (1 - 3)  benzocaine/menthol Lozenge 1 Lozenge Oral every 4 hours PRN Sore Throat  guaiFENesin Oral Liquid (Sugar-Free) 100 milliGRAM(s) Oral every 6 hours PRN Cough      FAMILY HISTORY:No hx of CAD      SOCIAL HISTORY:    [ x] Non-smoker    [x ] Alcohol-denies    Allergies    No Known Allergies    Intolerances    	    REVIEW OF SYSTEMS:  CONSTITUTIONAL: No fever, weight loss, or fatigue  EYES: No eye pain, visual disturbances, or discharge  ENT:  No difficulty hearing, tinnitus, vertigo; No sinus or throat pain  NECK: No pain or stiffness  RESPIRATORY: No cough, wheezing, chills or hemoptysis; + Shortness of Breath  CARDIOVASCULAR: No chest pain, palpitations, passing out, dizziness, or leg swelling  GASTROINTESTINAL: No abdominal or epigastric pain. No nausea, vomiting, or hematemesis; No diarrhea or constipation. No melena or hematochezia.  GENITOURINARY: No dysuria, frequency, hematuria, or incontinence  NEUROLOGICAL: No headaches, memory loss, loss of strength, numbness, or tremors  SKIN: No itching, burning, rashes, or lesions   LYMPH Nodes: No enlarged glands  ENDOCRINE: No heat or cold intolerance; No hair loss  MUSCULOSKELETAL: No joint pain or swelling; No muscle, back, or extremity pain  PSYCHIATRIC: No depression, anxiety, mood swings, or difficulty sleeping  HEME/LYMPH: No easy bruising, or bleeding gums  ALLERGY AND IMMUNOLOGIC: No hives or eczema	      PHYSICAL EXAM:  T(C): 36.7 (12-11-23 @ 11:39), Max: 39 (12-10-23 @ 12:56)  HR: 74 (12-11-23 @ 11:39) (71 - 102)  BP: 97/60 (12-11-23 @ 11:39) (96/58 - 122/70)  RR: 20 (12-11-23 @ 11:39) (20 - 24)  SpO2: 96% (12-11-23 @ 11:39) (89% - 99%)  Wt(kg): --  I&O's Summary      Appearance: Normal	  HEENT:   Normal oral mucosa, PERRL, EOMI	  Lymphatic: No lymphadenopathy  Cardiovascular: Normal S1 S2, No JVD, No murmurs, No edema  Respiratory: B/L ronchi	  Psychiatry: A & O x 3, Mood & affect appropriate  Gastrointestinal:  Soft, Non-tender, + BS	  Skin: No rashes, No ecchymoses, No cyanosis	  Neurologic: Non-focal  Extremities: Normal range of motion, No clubbing, cyanosis or edema  Vascular: Peripheral pulses palpable 2+ bilaterally        ECG:  	nsr,lvh,q inferior    LABS:	 	      Troponin I, High Sensitivity Result: 41.5 ng/L (12-10-23 @ 20:20)  Troponin I, High Sensitivity Result: 38.3 ng/L (12-10-23 @ 14:45)                          9.3    9.66  )-----------( 306      ( 11 Dec 2023 05:40 )             28.5     12-11    135  |  105  |  13  ----------------------------<  113<H>  3.3<L>   |  23  |  0.83    Ca    8.3<L>      11 Dec 2023 05:40  Phos  2.3     12-11  Mg     1.7     12-11    TPro  6.6  /  Alb  2.2<L>  /  TBili  0.9  /  DBili  x   /  AST  31  /  ALT  39  /  AlkPhos  137<H>  12-11    < from: CT Angio Chest PE Protocol w/ IV Cont (12.11.23 @ 00:34) >  ACC: [id number] EXAM:  CT ANGIO CHEST PULM [facility]   ORDERED BY: FARRAH BORREGO     PROCEDURE DATE:  12/11/2023          INTERPRETATION:  CLINICAL INFORMATION: Dyspnea, recent long flight    COMPARISON: None.    CONTRAST/COMPLICATIONS:  IV Contrast:Omnipaque 350  90 cc administered   10 cc discarded  Oral Contrast: NONE  Complications: None reported at time of study completion    PROCEDURE:  CT Angiography of the Chest.  Sagittal and coronal reformats were performed as well as 3D (MIP)   reconstructions.    FINDINGS:    LUNGS AND AIRWAYS: Patent central airways.  Extensive multifocal   predominantly peripheral consolidative opacities of the upper and lower   lobes with confluent consolidations in the dependent lower lobes   bilaterally.  PLEURA: No pleural effusion.  MEDIASTINUM AND PONCHO: No lymphadenopathy.  VESSELS: No acute pulmonary embolism to the subsegmental level.   Atherosclerotic changes of the aorta and coronaries. Mildly dilated   ascending aorta measuring up to 3.5 cm. No aortic dissection.  HEART: Heart size is normal. No pericardial effusion.  CHEST WALL AND LOWER NECK: Within normal limits.  VISUALIZED UPPER ABDOMEN: Within normal limits.  BONES: Degenerative changes.    IMPRESSION:  No acute pulmonary embolism.    Extensive multifocal consolidations bilaterally concerning for multifocal   pneumonia.        --- End of Report ---            RUPA NASCIMENTO MD; Attending Radiologist  This document has been electronically signed. Dec 11 2023  3:15AM    < end of copied text >  < from: Xray Chest 1 View- PORTABLE-Urgent (12.10.23 @ 14:06) >  ACC: [id number] EXAM:  XR CHEST PORTABLE URGENT 1V   ORDERED BY:  CORAL GODWIN     PROCEDURE DATE:  12/10/2023          INTERPRETATION:  AP chest on December 10, 2023 at 1:53 PM. Patient has   cough with fever and hypoxia.    COMPARISON:None available.    Heart magnified by technique.    There are mild bibasilar infiltrates left greater than right.    Another irregular infiltrate is suspect off the right upper hilum.    IMPRESSION: Bilateral lung infiltrates as above.    --- End of Report ---            CLIVE STALLWORTH MD; Attending Radiologist  This document has been electronically signed. Dec 10 2023  4:56PM    < end of copied text >

## 2023-12-11 NOTE — PATIENT PROFILE ADULT - FALL HARM RISK - HARM RISK INTERVENTIONS
Assistance with ambulation/Assistance OOB with selected safe patient handling equipment/Communicate Risk of Fall with Harm to all staff/Monitor gait and stability/Reinforce activity limits and safety measures with patient and family/Sit up slowly, dangle for a short time, stand at bedside before walking/Tailored Fall Risk Interventions/Visual Cue: Yellow wristband and red socks/Bed in lowest position, wheels locked, appropriate side rails in place/Call bell, personal items and telephone in reach/Instruct patient to call for assistance before getting out of bed or chair/Non-slip footwear when patient is out of bed/[place] to call system/Physically safe environment - no spills, clutter or unnecessary equipment/Purposeful Proactive Rounding/Room/bathroom lighting operational, light cord in reach Assistance with ambulation/Assistance OOB with selected safe patient handling equipment/Communicate Risk of Fall with Harm to all staff/Monitor gait and stability/Reinforce activity limits and safety measures with patient and family/Sit up slowly, dangle for a short time, stand at bedside before walking/Tailored Fall Risk Interventions/Visual Cue: Yellow wristband and red socks/Bed in lowest position, wheels locked, appropriate side rails in place/Call bell, personal items and telephone in reach/Instruct patient to call for assistance before getting out of bed or chair/Non-slip footwear when patient is out of bed/[place] to call system/Physically safe environment - no spills, clutter or unnecessary equipment/Purposeful Proactive Rounding/Room/bathroom lighting operational, light cord in reach

## 2023-12-11 NOTE — PROGRESS NOTE ADULT - ATTENDING COMMENTS
Pt's presentation appearing consistent with reactive airway disease (with report of long-standing h/o asthmatic COPD) w/ negative RVP thus far and no reports of any expectorations. However given her [place] ([place], [place] lives) will consider r/o T.b. At present will initiate rx for CAP (given b/l consolidations on cxr)  / UTI with IV zithro / rocephin. Pulm consult (Dr Daly) to follow. Pt's presentation appearing consistent with reactive airway disease (with report of long-standing h/o asthmatic COPD) w/ negative RVP thus far and no reports of any expectorations. However given her recent travel from [place] ([place], where pt lives) will consider r/o T.b. At present will initiate rx for CAP (given b/l consolidations on cxr)  / UTI with IV zithro / rocephin. Pulm consult (Dr Daly) to follow.

## 2023-12-11 NOTE — PROGRESS NOTE ADULT - PROBLEM SELECTOR PLAN 1
p/w cough, fever, chills, dyspnea   CXR: Bilateral lunf infiltrates, irregular infiltrate of the right upper hilum   in ED: [id number] , ,  /70 Sat 89% on RA   mild leukocytosis 11.9 with left shift  s/p Azithro and Rocephin and LR 1.5L  bolus   RVP panel neg   c/w Azithro and Rocephin   f/u blood cultures, sputum culture, legionella, and strep pneumo Ag urine  Dr. Daly Pulm consulted p/w cough, fever, chills, dyspnea   CXR: Bilateral lunf infiltrates, irregular infiltrate of the right upper hilum   in ED: [id number] , ,  /70 Sat 89% on RA   mild leukocytosis 11.9 with left shift  s/p Azithro and Rocephin and LR 1.5L  bolus   RVP panel neg   c/w Azithro and Rocephin   f/u blood cultures, sputum culture, legionella, and strep pneumo Ag urine  Dr. Daly Pulm consulted

## 2023-12-12 DIAGNOSIS — Z02.9 ENCOUNTER FOR ADMINISTRATIVE EXAMINATIONS, UNSPECIFIED: ICD-10-CM

## 2023-12-12 LAB
ALBUMIN SERPL ELPH-MCNC: 2.1 G/DL — LOW (ref 3.5–5)
ALBUMIN SERPL ELPH-MCNC: 2.1 G/DL — LOW (ref 3.5–5)
ALP SERPL-CCNC: 167 U/L — HIGH (ref 40–120)
ALP SERPL-CCNC: 167 U/L — HIGH (ref 40–120)
ALT FLD-CCNC: 37 U/L DA — SIGNIFICANT CHANGE UP (ref 10–60)
ALT FLD-CCNC: 37 U/L DA — SIGNIFICANT CHANGE UP (ref 10–60)
ANION GAP SERPL CALC-SCNC: 7 MMOL/L — SIGNIFICANT CHANGE UP (ref 5–17)
ANION GAP SERPL CALC-SCNC: 7 MMOL/L — SIGNIFICANT CHANGE UP (ref 5–17)
AST SERPL-CCNC: 24 U/L — SIGNIFICANT CHANGE UP (ref 10–40)
AST SERPL-CCNC: 24 U/L — SIGNIFICANT CHANGE UP (ref 10–40)
BILIRUB SERPL-MCNC: 0.8 MG/DL — SIGNIFICANT CHANGE UP (ref 0.2–1.2)
BILIRUB SERPL-MCNC: 0.8 MG/DL — SIGNIFICANT CHANGE UP (ref 0.2–1.2)
BUN SERPL-MCNC: 8 MG/DL — SIGNIFICANT CHANGE UP (ref 7–18)
BUN SERPL-MCNC: 8 MG/DL — SIGNIFICANT CHANGE UP (ref 7–18)
CALCIUM SERPL-MCNC: 8.7 MG/DL — SIGNIFICANT CHANGE UP (ref 8.4–10.5)
CALCIUM SERPL-MCNC: 8.7 MG/DL — SIGNIFICANT CHANGE UP (ref 8.4–10.5)
CHLORIDE SERPL-SCNC: 105 MMOL/L — SIGNIFICANT CHANGE UP (ref 96–108)
CHLORIDE SERPL-SCNC: 105 MMOL/L — SIGNIFICANT CHANGE UP (ref 96–108)
CO2 SERPL-SCNC: 25 MMOL/L — SIGNIFICANT CHANGE UP (ref 22–31)
CO2 SERPL-SCNC: 25 MMOL/L — SIGNIFICANT CHANGE UP (ref 22–31)
CREAT SERPL-MCNC: 0.79 MG/DL — SIGNIFICANT CHANGE UP (ref 0.5–1.3)
CREAT SERPL-MCNC: 0.79 MG/DL — SIGNIFICANT CHANGE UP (ref 0.5–1.3)
EGFR: 73 ML/MIN/1.73M2 — SIGNIFICANT CHANGE UP
EGFR: 73 ML/MIN/1.73M2 — SIGNIFICANT CHANGE UP
GLUCOSE SERPL-MCNC: 111 MG/DL — HIGH (ref 70–99)
GLUCOSE SERPL-MCNC: 111 MG/DL — HIGH (ref 70–99)
GRAM STN FLD: ABNORMAL
GRAM STN FLD: ABNORMAL
HCT VFR BLD CALC: 31.5 % — LOW (ref 34.5–45)
HCT VFR BLD CALC: 31.5 % — LOW (ref 34.5–45)
HGB BLD-MCNC: 10.3 G/DL — LOW (ref 11.5–15.5)
HGB BLD-MCNC: 10.3 G/DL — LOW (ref 11.5–15.5)
MCHC RBC-ENTMCNC: 29.4 PG — SIGNIFICANT CHANGE UP (ref 27–34)
MCHC RBC-ENTMCNC: 29.4 PG — SIGNIFICANT CHANGE UP (ref 27–34)
MCHC RBC-ENTMCNC: 32.7 GM/DL — SIGNIFICANT CHANGE UP (ref 32–36)
MCHC RBC-ENTMCNC: 32.7 GM/DL — SIGNIFICANT CHANGE UP (ref 32–36)
MCV RBC AUTO: 90 FL — SIGNIFICANT CHANGE UP (ref 80–100)
MCV RBC AUTO: 90 FL — SIGNIFICANT CHANGE UP (ref 80–100)
NRBC # BLD: 0 /100 WBCS — SIGNIFICANT CHANGE UP (ref 0–0)
NRBC # BLD: 0 /100 WBCS — SIGNIFICANT CHANGE UP (ref 0–0)
PLATELET # BLD AUTO: 395 K/UL — SIGNIFICANT CHANGE UP (ref 150–400)
PLATELET # BLD AUTO: 395 K/UL — SIGNIFICANT CHANGE UP (ref 150–400)
POTASSIUM SERPL-MCNC: 3.5 MMOL/L — SIGNIFICANT CHANGE UP (ref 3.5–5.3)
POTASSIUM SERPL-MCNC: 3.5 MMOL/L — SIGNIFICANT CHANGE UP (ref 3.5–5.3)
POTASSIUM SERPL-SCNC: 3.5 MMOL/L — SIGNIFICANT CHANGE UP (ref 3.5–5.3)
POTASSIUM SERPL-SCNC: 3.5 MMOL/L — SIGNIFICANT CHANGE UP (ref 3.5–5.3)
PROT SERPL-MCNC: 7.2 G/DL — SIGNIFICANT CHANGE UP (ref 6–8.3)
PROT SERPL-MCNC: 7.2 G/DL — SIGNIFICANT CHANGE UP (ref 6–8.3)
RBC # BLD: 3.5 M/UL — LOW (ref 3.8–5.2)
RBC # BLD: 3.5 M/UL — LOW (ref 3.8–5.2)
RBC # FLD: 14.6 % — HIGH (ref 10.3–14.5)
RBC # FLD: 14.6 % — HIGH (ref 10.3–14.5)
SODIUM SERPL-SCNC: 137 MMOL/L — SIGNIFICANT CHANGE UP (ref 135–145)
SODIUM SERPL-SCNC: 137 MMOL/L — SIGNIFICANT CHANGE UP (ref 135–145)
SPECIMEN SOURCE: SIGNIFICANT CHANGE UP
SPECIMEN SOURCE: SIGNIFICANT CHANGE UP
WBC # BLD: 10.28 K/UL — SIGNIFICANT CHANGE UP (ref 3.8–10.5)
WBC # BLD: 10.28 K/UL — SIGNIFICANT CHANGE UP (ref 3.8–10.5)
WBC # FLD AUTO: 10.28 K/UL — SIGNIFICANT CHANGE UP (ref 3.8–10.5)
WBC # FLD AUTO: 10.28 K/UL — SIGNIFICANT CHANGE UP (ref 3.8–10.5)

## 2023-12-12 RX ORDER — LANOLIN ALCOHOL/MO/W.PET/CERES
3 CREAM (GRAM) TOPICAL AT BEDTIME
Refills: 0 | Status: DISCONTINUED | OUTPATIENT
Start: 2023-12-12 | End: 2023-12-13

## 2023-12-12 RX ORDER — ONDANSETRON 8 MG/1
4 TABLET, FILM COATED ORAL EVERY 6 HOURS
Refills: 0 | Status: DISCONTINUED | OUTPATIENT
Start: 2023-12-12 | End: 2023-12-13

## 2023-12-12 RX ADMIN — Medication 3 MILLILITER(S): at 14:26

## 2023-12-12 RX ADMIN — Medication 650 MILLIGRAM(S): at 13:08

## 2023-12-12 RX ADMIN — Medication 100 MILLIGRAM(S): at 16:10

## 2023-12-12 RX ADMIN — Medication 3 MILLILITER(S): at 20:21

## 2023-12-12 RX ADMIN — Medication 100 MILLIGRAM(S): at 22:57

## 2023-12-12 RX ADMIN — Medication 3 MILLILITER(S): at 03:15

## 2023-12-12 RX ADMIN — Medication 3 MILLILITER(S): at 08:02

## 2023-12-12 RX ADMIN — Medication 650 MILLIGRAM(S): at 14:21

## 2023-12-12 RX ADMIN — Medication 100 MILLIGRAM(S): at 22:58

## 2023-12-12 RX ADMIN — Medication 100 MILLIGRAM(S): at 05:30

## 2023-12-12 RX ADMIN — ENOXAPARIN SODIUM 40 MILLIGRAM(S): 100 INJECTION SUBCUTANEOUS at 22:59

## 2023-12-12 RX ADMIN — AZITHROMYCIN 255 MILLIGRAM(S): 500 TABLET, FILM COATED ORAL at 16:09

## 2023-12-12 RX ADMIN — CEFTRIAXONE 100 MILLIGRAM(S): 500 INJECTION, POWDER, FOR SOLUTION INTRAMUSCULAR; INTRAVENOUS at 16:09

## 2023-12-12 RX ADMIN — Medication 3 MILLIGRAM(S): at 22:58

## 2023-12-12 NOTE — PROGRESS NOTE ADULT - PROBLEM SELECTOR PLAN 1
fever, leukocytosis, and tachycardia on admission  p/w cough, fever, chills, dyspnea   CXR: Bilateral lung infiltrates, irregular infiltrate of the right upper hilum   RVP panel neg   c/w Azithro and Rocephin   duoneb nebulizers   tessalon pearls   guaifenesin oral syrup  Cepacol lozenges   f/u quantiferon gold test   blood cultures neg  f/u sputum culture, legionella, and strep pneumo Ag urine  maintain 02 sat 93-95%  Dr. Daly Pulm

## 2023-12-12 NOTE — PROGRESS NOTE ADULT - PROBLEM SELECTOR PLAN 5
chest pain associated with cough  trop x2 neg   EKG NSR  murmur noted on exam  f/u Echo  Cardio Dr. Oviedo

## 2023-12-12 NOTE — PROGRESS NOTE ADULT - SUBJECTIVE AND OBJECTIVE BOX
Date of Service 12-12-23 @ 09:35    CHIEF COMPLAINT:Patient is a 83y old  Female who presents with a chief complaint of sepsis 2/2 PNA and UTI.Pt appears comfortable.    	  REVIEW OF SYSTEMS:  CONSTITUTIONAL: No fever, weight loss, or fatigue  EYES: No eye pain, visual disturbances, or discharge  ENT:  No difficulty hearing, tinnitus, vertigo; No sinus or throat pain  NECK: No pain or stiffness  RESPIRATORY: No cough, wheezing, chills or hemoptysis; No Shortness of Breath  CARDIOVASCULAR: No chest pain, palpitations, passing out, dizziness, or leg swelling  GASTROINTESTINAL: No abdominal or epigastric pain. No nausea, vomiting, or hematemesis; No diarrhea or constipation. No melena or hematochezia.  GENITOURINARY: No dysuria, frequency, hematuria, or incontinence  NEUROLOGICAL: No headaches, memory loss, loss of strength, numbness, or tremors  SKIN: No itching, burning, rashes, or lesions   LYMPH Nodes: No enlarged glands  ENDOCRINE: No heat or cold intolerance; No hair loss  MUSCULOSKELETAL: No joint pain or swelling; No muscle, back, or extremity pain  PSYCHIATRIC: No depression, anxiety, mood swings, or difficulty sleeping  HEME/LYMPH: No easy bruising, or bleeding gums  ALLERGY AND IMMUNOLOGIC: No hives or eczema	      PHYSICAL EXAM:  T(C): 36.7 (12-12-23 @ 05:24), Max: 37.8 (12-11-23 @ 20:51)  HR: 89 (12-12-23 @ 05:24) (74 - 92)  BP: 119/77 (12-12-23 @ 05:24) (97/60 - 119/77)  RR: 18 (12-12-23 @ 05:24) (18 - 20)  SpO2: 92% (12-12-23 @ 05:24) (91% - 96%)  Wt(kg): --  I&O's Summary      Appearance: Normal	  HEENT:   Normal oral mucosa, PERRL, EOMI	  Lymphatic: No lymphadenopathy  Cardiovascular: Normal S1 S2, No JVD, No murmurs, No edema  Respiratory: Lungs clear to auscultation	  Psychiatry: A & O x 3, Mood & affect appropriate  Gastrointestinal:  Soft, Non-tender, + BS	  Skin: No rashes, No ecchymoses, No cyanosis	  Neurologic: Non-focal  Extremities: Normal range of motion, No clubbing, cyanosis or edema  Vascular: Peripheral pulses palpable 2+ bilaterally    MEDICATIONS  (STANDING):  albuterol/ipratropium for Nebulization 3 milliLiter(s) Nebulizer every 6 hours  azithromycin  IVPB 500 milliGRAM(s) IV Intermittent every 24 hours  benzonatate 100 milliGRAM(s) Oral three times a day  cefTRIAXone   IVPB 1000 milliGRAM(s) IV Intermittent every 24 hours  enoxaparin Injectable 40 milliGRAM(s) SubCutaneous every 24 hours  influenza  Vaccine (HIGH DOSE) 0.7 milliLiter(s) IntraMuscular once    	  	  LABS:	 	      Troponin I, High Sensitivity Result: 41.5 ng/L (12-10 @ 20:20)  Troponin I, High Sensitivity Result: 38.3 ng/L (12-10 @ 14:45)                            10.3   10.28 )-----------( 395      ( 12 Dec 2023 07:55 )             31.5     12-12    137  |  105  |  8   ----------------------------<  111<H>  3.5   |  25  |  0.79    Ca    8.7      12 Dec 2023 07:55  Phos  2.3     12-11  Mg     1.7     12-11    TPro  7.2  /  Alb  2.1<L>  /  TBili  0.8  /  DBili  x   /  AST  24  /  ALT  37  /  AlkPhos  167<H>  12-12    urine cx >100,000 e coli.

## 2023-12-12 NOTE — PROGRESS NOTE ADULT - SUBJECTIVE AND OBJECTIVE BOX
NP Note discussed with  Primary Attending    Patient is a 83y old  Female who presents with a chief complaint of sepsis 2/2 PNA and UTI (12 Dec 2023 09:35).  Seen and examined, noted with frequent cough associated with pleuritic pain.      INTERVAL HPI/OVERNIGHT EVENTS: no new complaints    MEDICATIONS  (STANDING):  albuterol/ipratropium for Nebulization 3 milliLiter(s) Nebulizer every 6 hours  azithromycin  IVPB 500 milliGRAM(s) IV Intermittent every 24 hours  benzonatate 100 milliGRAM(s) Oral three times a day  cefTRIAXone   IVPB 1000 milliGRAM(s) IV Intermittent every 24 hours  enoxaparin Injectable 40 milliGRAM(s) SubCutaneous every 24 hours  influenza  Vaccine (HIGH DOSE) 0.7 milliLiter(s) IntraMuscular once    MEDICATIONS  (PRN):  acetaminophen     Tablet .. 650 milliGRAM(s) Oral every 6 hours PRN Temp greater or equal to 38C (100.4F), Mild Pain (1 - 3)  benzocaine/menthol Lozenge 1 Lozenge Oral every 4 hours PRN Sore Throat  guaiFENesin Oral Liquid (Sugar-Free) 100 milliGRAM(s) Oral every 6 hours PRN Cough  ondansetron Injectable 4 milliGRAM(s) IV Push every 6 hours PRN Nausea      __________________________________________________  REVIEW OF SYSTEMS:    CONSTITUTIONAL: No fever,   EYES: no acute visual disturbances  NECK: No pain or stiffness  RESPIRATORY: No cough; No shortness of breath  CARDIOVASCULAR: No chest pain, no palpitations  GASTROINTESTINAL: No pain. No nausea or vomiting; No diarrhea   NEUROLOGICAL: No headache or numbness, no tremors  MUSCULOSKELETAL: No joint pain, no muscle pain  GENITOURINARY: no dysuria, no frequency, no hesitancy  PSYCHIATRY: no depression , no anxiety  ALL OTHER  ROS negative        Vital Signs Last 24 Hrs  T(C): 36.7 (12 Dec 2023 05:24), Max: 37.8 (11 Dec 2023 20:51)  T(F): 98.1 (12 Dec 2023 05:24), Max: 100 (11 Dec 2023 20:51)  HR: 89 (12 Dec 2023 05:24) (85 - 92)  BP: 119/77 (12 Dec 2023 05:24) (97/60 - 119/77)  BP(mean): --  RR: 18 (12 Dec 2023 05:24) (18 - 20)  SpO2: 92% (12 Dec 2023 05:24) (91% - 94%)    Parameters below as of 12 Dec 2023 05:24  Patient On (Oxygen Delivery Method): nasal cannula  O2 Flow (L/min): 4      ________________________________________________  PHYSICAL EXAM:  GENERAL: NAD  HEENT: Normocephalic;  conjunctivae and sclerae clear; moist mucous membranes;   NECK : supple  CHEST/LUNG: Clear to auscultation bilaterally with good air entry   HEART: S1 S2  regular; no murmurs, gallops or rubs  ABDOMEN: Soft, Nontender, Nondistended; Bowel sounds present  EXTREMITIES: no cyanosis; no edema; no calf tenderness  SKIN: warm and dry; no rash  NERVOUS SYSTEM:  Awake and alert; Oriented  to place, person and time ; no new deficits    _________________________________________________  LABS:                        10.3   10.28 )-----------( 395      ( 12 Dec 2023 07:55 )             31.5     12-12    137  |  105  |  8   ----------------------------<  111<H>  3.5   |  25  |  0.79    Ca    8.7      12 Dec 2023 07:55  Phos  2.3     12-11  Mg     1.7     12-11    TPro  7.2  /  Alb  2.1<L>  /  TBili  0.8  /  DBili  x   /  AST  24  /  ALT  37  /  AlkPhos  167<H>  12-12    PT/INR - ( 10 Dec 2023 14:45 )   PT: 13.2 sec;   INR: 1.16 ratio         PTT - ( 10 Dec 2023 14:45 )  PTT:31.2 sec  Urinalysis Basic - ( 12 Dec 2023 07:55 )    Color: x / Appearance: x / SG: x / pH: x  Gluc: 111 mg/dL / Ketone: x  / Bili: x / Urobili: x   Blood: x / Protein: x / Nitrite: x   Leuk Esterase: x / RBC: x / WBC x   Sq Epi: x / Non Sq Epi: x / Bacteria: x      CAPILLARY BLOOD GLUCOSE    RADIOLOGY & ADDITIONAL TESTS:  < from: CT Angio Chest PE Protocol w/ IV Cont (12.11.23 @ 00:34) >    ACC: [id number] EXAM:  CT ANGIO CHEST PULM ART WAWIC   ORDERED BY: FARRAH BORREGO     PROCEDURE DATE:  12/11/2023          INTERPRETATION:  CLINICAL INFORMATION: Dyspnea, recent long flight    COMPARISON: None.    CONTRAST/COMPLICATIONS:  IV Contrast:Omnipaque 350  90 cc administered   10 cc discarded  Oral Contrast: NONE  Complications: None reported at time of study completion    PROCEDURE:  CT Angiography of the Chest.  Sagittal and coronal reformats were performed as well as 3D (MIP)   reconstructions.    FINDINGS:    LUNGS AND AIRWAYS: Patent central airways.  Extensive multifocal   predominantly peripheral consolidative opacities of the upper and lower   lobes with confluent consolidations in the dependent lower lobes   bilaterally.  PLEURA: No pleural effusion.  MEDIASTINUM AND PONCHO: No lymphadenopathy.  VESSELS: No acute pulmonary embolism to the subsegmental level.   Atherosclerotic changes of the aorta and coronaries. Mildly dilated   ascending aorta measuring up to 3.5 cm. No aortic dissection.  HEART: Heart size is normal. No pericardial effusion.  CHEST WALL AND LOWER NECK: Within normal limits.  VISUALIZED UPPER ABDOMEN: Within normal limits.  BONES: Degenerative changes.    IMPRESSION:  No acute pulmonary embolism.    Extensive multifocal consolidations bilaterally concerning for multifocal   pneumonia.    < end of copied text >    < from: Xray Chest 1 View- PORTABLE-Urgent (12.10.23 @ 14:06) >    ACC: [id number] EXAM:  XR CHEST PORTABLE URGENT 1V   ORDERED BY:  CORAL GODWIN     PROCEDURE DATE:  12/10/2023          INTERPRETATION:  AP chest on December 10, 2023 at 1:53 PM. Patient has   cough with fever and hypoxia.    COMPARISON:None available.    Heart magnified by technique.    There are mild bibasilar infiltrates left greater than right.    Another irregular infiltrate is suspect off the right upper hilum.    IMPRESSION: Bilateral lung infiltrates as above.    < end of copied text >      Culture - Urine (12.10.23 @ 15:40)    Specimen Source: Clean Catch Clean Catch (Midstream)   Culture Results:   >100,000 CFU/ml Escherichia coli    Urine Microscopic-Add On (NC) (12.10.23 @ 15:40)    Squamous Epithelial Cells: Present   Calcium Oxalate Crystals: Present   Bacteria: Too Numerous to count /HPF   Red Blood Cell - Urine: 5 /HPF   White Blood Cell - Urine: 50 /HPF      Imaging Personally Reviewed:  YES/NO    Consultant(s) Notes Reviewed:   YES/ No    Care Discussed with Consultants :     Plan of care was discussed with patient and /or primary care giver; all questions and concerns were addressed and care was aligned with patient's wishes.     NP Note discussed with  Primary Attending    Patient is a 83y old  Female who presents with a chief complaint of sepsis 2/2 PNA and UTI (12 Dec 2023 09:35).  Seen and examined, noted with frequent cough associated with pleuritic pain.      INTERVAL HPI/OVERNIGHT EVENTS: no new complaints    MEDICATIONS  (STANDING):  albuterol/ipratropium for Nebulization 3 milliLiter(s) Nebulizer every 6 hours  azithromycin  IVPB 500 milliGRAM(s) IV Intermittent every 24 hours  benzonatate 100 milliGRAM(s) Oral three times a day  cefTRIAXone   IVPB 1000 milliGRAM(s) IV Intermittent every 24 hours  enoxaparin Injectable 40 milliGRAM(s) SubCutaneous every 24 hours  influenza  Vaccine (HIGH DOSE) 0.7 milliLiter(s) IntraMuscular once    MEDICATIONS  (PRN):  acetaminophen     Tablet .. 650 milliGRAM(s) Oral every 6 hours PRN Temp greater or equal to 38C (100.4F), Mild Pain (1 - 3)  benzocaine/menthol Lozenge 1 Lozenge Oral every 4 hours PRN Sore Throat  guaiFENesin Oral Liquid (Sugar-Free) 100 milliGRAM(s) Oral every 6 hours PRN Cough  ondansetron Injectable 4 milliGRAM(s) IV Push every 6 hours PRN Nausea      __________________________________________________  REVIEW OF SYSTEMS:    CONSTITUTIONAL: No fever,   EYES: no acute visual disturbances  NECK: No pain or stiffness  RESPIRATORY: No cough; No shortness of breath  CARDIOVASCULAR: No chest pain, no palpitations  GASTROINTESTINAL: No pain. No nausea or vomiting; No diarrhea   NEUROLOGICAL: No headache or numbness, no tremors  MUSCULOSKELETAL: No joint pain, no muscle pain  GENITOURINARY: no dysuria, no frequency, no hesitancy  PSYCHIATRY: no depression , no anxiety  ALL OTHER  ROS negative        Vital Signs Last 24 Hrs  T(C): 36.7 (12 Dec 2023 05:24), Max: 37.8 (11 Dec 2023 20:51)  T(F): 98.1 (12 Dec 2023 05:24), Max: 100 (11 Dec 2023 20:51)  HR: 89 (12 Dec 2023 05:24) (85 - 92)  BP: 119/77 (12 Dec 2023 05:24) (97/60 - 119/77)  BP(mean): --  RR: 18 (12 Dec 2023 05:24) (18 - 20)  SpO2: 92% (12 Dec 2023 05:24) (91% - 94%)    Parameters below as of 12 Dec 2023 05:24  Patient On (Oxygen Delivery Method): nasal cannula  O2 Flow (L/min): 4      ________________________________________________  PHYSICAL EXAM:  GENERAL: NAD  HEENT: Normocephalic;  conjunctivae and sclerae clear; moist mucous membranes;   NECK : supple  CHEST/LUNG: Clear to auscultation bilaterally with good air entry   HEART: S1 S2  regular; no murmurs, gallops or rubs  ABDOMEN: Soft, Nontender, Nondistended; Bowel sounds present  EXTREMITIES: no cyanosis; no edema; no calf tenderness  SKIN: warm and dry; no rash  NERVOUS SYSTEM:  Awake and alert; Oriented  to place, person and time ; no new deficits    _________________________________________________  LABS:                        10.3   10.28 )-----------( 395      ( 12 Dec 2023 07:55 )             31.5     12-12    137  |  105  |  8   ----------------------------<  111<H>  3.5   |  25  |  0.79    Ca    8.7      12 Dec 2023 07:55  Phos  2.3     12-11  Mg     1.7     12-11    TPro  7.2  /  Alb  2.1<L>  /  TBili  0.8  /  DBili  x   /  AST  24  /  ALT  37  /  AlkPhos  167<H>  12-12    PT/INR - ( 10 Dec 2023 14:45 )   PT: 13.2 sec;   INR: 1.16 ratio         PTT - ( 10 Dec 2023 14:45 )  PTT:31.2 sec  Urinalysis Basic - ( 12 Dec 2023 07:55 )    Color: x / Appearance: x / SG: x / pH: x  Gluc: 111 mg/dL / Ketone: x  / Bili: x / Urobili: x   Blood: x / Protein: x / Nitrite: x   Leuk Esterase: x / RBC: x / WBC x   Sq Epi: x / Non Sq Epi: x / Bacteria: x      CAPILLARY BLOOD GLUCOSE    RADIOLOGY & ADDITIONAL TESTS:  < from: CT Angio Chest PE Protocol w/ IV Cont (12.11.23 @ 00:34) >    ACC: [id number] EXAM:  CT ANGIO CHEST PULM ART WAWIC   ORDERED BY: FARRAH BORREGO     PROCEDURE DATE:  12/11/2023          INTERPRETATION:  CLINICAL INFORMATION: Dyspnea, recent long flight    COMPARISON: None.    CONTRAST/COMPLICATIONS:  IV Contrast:Omnipaque 350  90 cc administered   10 cc discarded  Oral Contrast: NONE  Complications: None reported at time of study completion    PROCEDURE:  CT Angiography of the Chest.  Sagittal and coronal reformats were performed as well as 3D (MIP)   reconstructions.    FINDINGS:    LUNGS AND AIRWAYS: Patent central airways.  Extensive multifocal   predominantly peripheral consolidative opacities of the upper and lower   lobes with confluent consolidations in the dependent lower lobes   bilaterally.  PLEURA: No pleural effusion.  MEDIASTINUM AND PONCHO: No lymphadenopathy.  VESSELS: No acute pulmonary embolism to the subsegmental level.   Atherosclerotic changes of the aorta and coronaries. Mildly dilated   ascending aorta measuring up to 3.5 cm. No aortic dissection.  HEART: Heart size is normal. No pericardial effusion.  CHEST WALL AND LOWER NECK: Within normal limits.  VISUALIZED UPPER ABDOMEN: Within normal limits.  BONES: Degenerative changes.    IMPRESSION:  No acute pulmonary embolism.    Extensive multifocal consolidations bilaterally concerning for multifocal   pneumonia.    < end of copied text >    < from: Xray Chest 1 View- PORTABLE-Urgent (12.10.23 @ 14:06) >    ACC: [id number] EXAM:  XR CHEST PORTABLE URGENT 1V   ORDERED BY:  CORAL GODWIN     PROCEDURE DATE:  12/10/2023          INTERPRETATION:  AP chest on December 10, 2023 at 1:53 PM. Patient has   cough with fever and hypoxia.    COMPARISON:None available.    Heart magnified by technique.    There are mild bibasilar infiltrates left greater than right.    Another irregular infiltrate is suspect off the right upper hilum.    IMPRESSION: Bilateral lung infiltrates as above.    < end of copied text >      Culture - Urine (12.10.23 @ 15:40)    Specimen Source: Clean Catch Clean Catch (Midstream)   Culture Results:   >100,000 CFU/ml Escherichia coli    Urine Microscopic-Add On (NC) (12.10.23 @ 15:40)    Squamous Epithelial Cells: Present   Calcium Oxalate Crystals: Present   Bacteria: Too Numerous to count /HPF   Red Blood Cell - Urine: 5 /HPF   White Blood Cell - Urine: 50 /HPF      Imaging Personally Reviewed:  YES/NO    Consultant(s) Notes Reviewed:   YES/ No    Care Discussed with Consultants :     Plan of care was discussed with patient and /or primary care giver; all questions and concerns were addressed and care was aligned with patient's wishes.

## 2023-12-12 NOTE — PROGRESS NOTE ADULT - ATTENDING COMMENTS
Pt's presentation appearing consistent with reactive airway disease (with report of long-standing h/o asthmatic COPD) w/ negative RVP thus far and no reports of any expectorations. However [place] ([place], [place] will consider r/o T.b. At present will initiate rx for CAP (given b/l consolidations on cxr)  / UTI with IV zithro / rocephin. Pulm consult (Dr Daly) to follow.    12/12/23 - Considering pt has no concerning lesions on CXR for T.b. a QuantiFeron test alone should suffice. Pt reporting subjective improvement. Can d/c home on PO abx to be completed (Augmentin x 5 days). F/U procalcitonin level. Pt's presentation appearing consistent with reactive airway disease (with report of long-standing h/o asthmatic COPD) w/ negative RVP thus far and no reports of any expectorations. However given [place], [place] lives[place] will consider r/o T.b. At present will initiate rx for CAP (given b/l consolidations on cxr)  / UTI with IV zithro / rocephin. Pulm consult (Dr Daly) to follow.    12/12/23 - Considering pt has no concerning lesions on CXR for T.b. a QuantiFeron test alone should suffice. Pt reporting subjective improvement. Can d/c home on PO abx to be completed (Augmentin x 5 days). F/U procalcitonin level.

## 2023-12-12 NOTE — PROGRESS NOTE ADULT - PROBLEM SELECTOR PLAN 1
p/w cough, fever, chills, dyspnea   CXR: Bilateral lunf infiltrates, irregular infiltrate of the right upper hilum   RVP panel neg   c/w Azithro and Rocephin   duoneb nebulizers   tesselon pearls   guaifenesin oral syrup  cepacol lozenges   f/u quantiferon gold test   f/u blood cultures, sputum culture, legionella, and strep pneumo Ag urine  maintain 02 sat 93-95%  Dr. Daly Pulm

## 2023-12-12 NOTE — PROGRESS NOTE ADULT - SUBJECTIVE AND OBJECTIVE BOX
MARILEE ESPINOZA  MR# [id number]  83yFemale        Patient is a 83y old  Female who presents with a chief complaint of sepsis 2/2 PNA and UTI (12 Dec 2023 09:35)      INTERVAL HPI/OVERNIGHT EVENTS:  Patient seen and examined at bedside. No notations of chest pain, palpitation, SOB, orthopnea, nausea, vomiting or abdominal pain.    ALLERGIES  No Known Allergies      MEDICATIONS  acetaminophen     Tablet .. 650 milliGRAM(s) Oral every 6 hours PRN Temp greater or equal to 38C (100.4F), Mild Pain (1 - 3)  albuterol/ipratropium for Nebulization 3 milliLiter(s) Nebulizer every 6 hours  azithromycin  IVPB 500 milliGRAM(s) IV Intermittent every 24 hours  benzocaine/menthol Lozenge 1 Lozenge Oral every 4 hours PRN Sore Throat  benzonatate 100 milliGRAM(s) Oral three times a day  cefTRIAXone   IVPB 1000 milliGRAM(s) IV Intermittent every 24 hours  enoxaparin Injectable 40 milliGRAM(s) SubCutaneous every 24 hours  guaiFENesin Oral Liquid (Sugar-Free) 100 milliGRAM(s) Oral every 6 hours PRN Cough  influenza  Vaccine (HIGH DOSE) 0.7 milliLiter(s) IntraMuscular once  ondansetron Injectable 4 milliGRAM(s) IV Push every 6 hours PRN Nausea              REVIEW OF SYSTEMS:  CONSTITUTIONAL: No fever, weight loss, or fatigue  EYES: No eye pain, visual disturbances, or discharge  ENT:  No difficulty hearing, tinnitus, vertigo; No sinus or throat pain  NECK: No pain or stiffness  RESPIRATORY: No cough, wheezing, chills or hemoptysis; No Shortness of Breath  CARDIOVASCULAR: No chest pain, palpitations, passing out, dizziness, or leg swelling  GASTROINTESTINAL: No abdominal or epigastric pain. No nausea, vomiting, or hematemesis; No diarrhea or constipation. No melena or hematochezia.  GENITOURINARY: No dysuria, frequency, hematuria, or incontinence  NEUROLOGICAL: No headaches, memory loss, loss of strength, numbness, or tremors  SKIN: No itching, burning, rashes, or lesions   LYMPH Nodes: No enlarged glands  ENDOCRINE: No heat or cold intolerance; No hair loss  MUSCULOSKELETAL: No joint pain or swelling; No muscle, back, or extremity pain  PSYCHIATRIC: No depression, anxiety, mood swings, or difficulty sleeping  HEME/LYMPH: No easy bruising, or bleeding gums  ALLERGY AND IMMUNOLOGIC: No hives or eczema	    [ ] All others negative	  [ ] Unable to obtain      T(C): 36.7 (12-12-23 @ 05:24), Max: 37.8 (12-11-23 @ 20:51)  T(F): 98.1 (12-12-23 @ 05:24), Max: 100 (12-11-23 @ 20:51)  HR: 89 (12-12-23 @ 05:24) (85 - 92)  BP: 119/77 (12-12-23 @ 05:24) (97/60 - 119/77)  RR: 18 (12-12-23 @ 05:24) (18 - 20)  SpO2: 92% (12-12-23 @ 05:24) (91% - 94%)  Wt(kg): --    I&O's Summary        PHYSICAL EXAM:  A X O x  HEAD:  Atraumatic, Normocephalic  EYES: EOMI, PERRLA, conjunctiva and sclera clear  NECK: Supple, No JVD, Normal thyroid  Resp: CTAB, No crackles, wheezing,   CVS: Regular rate and rhythm; No discernable murmurs, rubs, or gallops  ABD: Soft, Nontender, Nondistended; Bowel sounds present  EXTREMITIES:  2+ Peripheral Pulses, No edema  LYMPH: No dicernable lymphadenopathy noted  GENERAL: NAD, well-groomed, well-developed      LABS:                        10.3   10.28 )-----------( 395      ( 12 Dec 2023 07:55 )             31.5     12-12    137  |  105  |  8   ----------------------------<  111<H>  3.5   |  25  |  0.79    Ca    8.7      12 Dec 2023 07:55  Phos  2.3     12-11  Mg     1.7     12-11    TPro  7.2  /  Alb  2.1<L>  /  TBili  0.8  /  DBili  x   /  AST  24  /  ALT  37  /  AlkPhos  167<H>  12-12    PT/INR - ( 10 Dec 2023 14:45 )   PT: 13.2 sec;   INR: 1.16 ratio         PTT - ( 10 Dec 2023 14:45 )  PTT:31.2 sec  Urinalysis Basic - ( 12 Dec 2023 07:55 )    Color: x / Appearance: x / SG: x / pH: x  Gluc: 111 mg/dL / Ketone: x  / Bili: x / Urobili: x   Blood: x / Protein: x / Nitrite: x   Leuk Esterase: x / RBC: x / WBC x   Sq Epi: x / Non Sq Epi: x / Bacteria: x      CAPILLARY BLOOD GLUCOSE          Troponins:  ProBNP:  Lipid Profile:   HgA1c:  TSH:           RADIOLOGY & ADDITIONAL TESTS:    Imaging Personally Reviewed:  [ ] YES  [ ] NO      Consultant(s) Notes Reviewed:  [x ] YES  [ ] NO    Care Discussed with Consultants/Other Providers [ x] YES  [ ] NO          PAST MEDICAL & SURGICAL HISTORY:  CAD (coronary artery disease)      HTN (hypertension)            Pneumonia due to infectious organism    Handoff    MEWS Score    CAD (coronary artery disease)    HTN (hypertension)    Pneumonia    Pneumonia    HTN (hypertension)    Prophylactic measure    Dyspnea    Acute UTI    Chest pain    Sepsis due to pneumonia    W/DIFF BREATHING,CHEST PAIN    Hypoxia    Fever    SysAdmin_VisitLink             MARILEE ESPINOZA  MR# [id number]  83yFemale        Patient is a 83y old  Female who presents with a chief complaint of sepsis 2/2 PNA and UTI (12 Dec 2023 09:35)      INTERVAL HPI/OVERNIGHT EVENTS:  Patient seen and examined at bedside. No notations of chest pain, palpitation, SOB, orthopnea, nausea, vomiting or abdominal pain.    ALLERGIES  No Known Allergies      MEDICATIONS  acetaminophen     Tablet .. 650 milliGRAM(s) Oral every 6 hours PRN Temp greater or equal to 38C (100.4F), Mild Pain (1 - 3)  albuterol/ipratropium for Nebulization 3 milliLiter(s) Nebulizer every 6 hours  azithromycin  IVPB 500 milliGRAM(s) IV Intermittent every 24 hours  benzocaine/menthol Lozenge 1 Lozenge Oral every 4 hours PRN Sore Throat  benzonatate 100 milliGRAM(s) Oral three times a day  cefTRIAXone   IVPB 1000 milliGRAM(s) IV Intermittent every 24 hours  enoxaparin Injectable 40 milliGRAM(s) SubCutaneous every 24 hours  guaiFENesin Oral Liquid (Sugar-Free) 100 milliGRAM(s) Oral every 6 hours PRN Cough  influenza  Vaccine (HIGH DOSE) 0.7 milliLiter(s) IntraMuscular once  ondansetron Injectable 4 milliGRAM(s) IV Push every 6 hours PRN Nausea              REVIEW OF SYSTEMS:  CONSTITUTIONAL: No fever, weight loss, or fatigue  EYES: No eye pain, visual disturbances, or discharge  ENT:  No difficulty hearing, tinnitus, vertigo; No sinus or throat pain  NECK: No pain or stiffness  RESPIRATORY: No cough, wheezing, chills or hemoptysis; No Shortness of Breath  CARDIOVASCULAR: No chest pain, palpitations, passing out, dizziness, or leg swelling  GASTROINTESTINAL: No abdominal or epigastric pain. No nausea, vomiting, or hematemesis; No diarrhea or constipation. No melena or hematochezia.  GENITOURINARY: No dysuria, frequency, hematuria, or incontinence  NEUROLOGICAL: No headaches, memory loss, loss of strength, numbness, or tremors  SKIN: No itching, burning, rashes, or lesions   LYMPH Nodes: No enlarged glands  ENDOCRINE: No heat or cold intolerance; No hair loss  MUSCULOSKELETAL: No joint pain or swelling; No muscle, back, or extremity pain  PSYCHIATRIC: No depression, anxiety, mood swings, or difficulty sleeping  HEME/LYMPH: No easy bruising, or bleeding gums  ALLERGY AND IMMUNOLOGIC: No hives or eczema	    [ ] All others negative	  [ ] Unable to obtain      T(C): 36.7 (12-12-23 @ 05:24), Max: 37.8 (12-11-23 @ 20:51)  T(F): 98.1 (12-12-23 @ 05:24), Max: 100 (12-11-23 @ 20:51)  HR: 89 (12-12-23 @ 05:24) (85 - 92)  BP: 119/77 (12-12-23 @ 05:24) (97/60 - 119/77)  RR: 18 (12-12-23 @ 05:24) (18 - 20)  SpO2: 92% (12-12-23 @ 05:24) (91% - 94%)  Wt(kg): --    I&O's Summary        PHYSICAL EXAM:  A X O x  HEAD:  Atraumatic, Normocephalic  EYES: EOMI, PERRLA, conjunctiva and sclera clear  NECK: Supple, No JVD, Normal thyroid  Resp: CTAB, No crackles, wheezing,   CVS: Regular rate and rhythm; No discernable murmurs, rubs, or gallops  ABD: Soft, Nontender, Nondistended; Bowel sounds present  EXTREMITIES:  2+ Peripheral Pulses, No edema  LYMPH: No dicernable lymphadenopathy noted  GENERAL: NAD, well-groomed, well-developed      LABS:                        10.3   10.28 )-----------( 395      ( 12 Dec 2023 07:55 )             31.5     12-12    137  |  105  |  8   ----------------------------<  111<H>  3.5   |  25  |  0.79    Ca    8.7      12 Dec 2023 07:55  Phos  2.3     12-11  Mg     1.7     12-11    TPro  7.2  /  Alb  2.1<L>  /  TBili  0.8  /  DBili  x   /  AST  24  /  ALT  37  /  AlkPhos  167<H>  12-12    PT/INR - ( 10 Dec 2023 14:45 )   PT: 13.2 sec;   INR: 1.16 ratio         PTT - ( 10 Dec 2023 14:45 )  PTT:31.2 sec  Urinalysis Basic - ( 12 Dec 2023 07:55 )    Color: x / Appearance: x / SG: x / pH: x  Gluc: 111 mg/dL / Ketone: x  / Bili: x / Urobili: x   Blood: x / Protein: x / Nitrite: x   Leuk Esterase: x / RBC: x / WBC x   Sq Epi: x / Non Sq Epi: x / Bacteria: x      CAPILLARY BLOOD GLUCOSE          Troponins:  ProBNP:  Lipid Profile:   HgA1c:  TSH:           RADIOLOGY & ADDITIONAL TESTS:    Imaging Personally Reviewed:  [ ] YES  [ ] NO      Consultant(s) Notes Reviewed:  [x ] YES  [ ] NO    Care Discussed with Consultants/Other Providers [ x] YES  [ ] NO          PAST MEDICAL & SURGICAL HISTORY:  CAD (coronary artery disease)      HTN (hypertension)            Pneumonia due to infectious organism    Handoff    MEWS Score    CAD (coronary artery disease)    HTN (hypertension)    Pneumonia    Pneumonia    HTN (hypertension)    Prophylactic measure    Dyspnea    Acute UTI    Chest pain    Sepsis due to pneumonia    W/DIFF BREATHING,CHEST PAIN    Hypoxia    Fever    SysAdmin_VisitLink

## 2023-12-13 VITALS
DIASTOLIC BLOOD PRESSURE: 72 MMHG | RESPIRATION RATE: 18 BRPM | HEART RATE: 89 BPM | TEMPERATURE: 98 F | SYSTOLIC BLOOD PRESSURE: 123 MMHG | OXYGEN SATURATION: 94 %

## 2023-12-13 LAB
-  AMOXICILLIN/CLAVULANIC ACID: SIGNIFICANT CHANGE UP
-  AMOXICILLIN/CLAVULANIC ACID: SIGNIFICANT CHANGE UP
-  AMPICILLIN/SULBACTAM: SIGNIFICANT CHANGE UP
-  AMPICILLIN/SULBACTAM: SIGNIFICANT CHANGE UP
-  AMPICILLIN: SIGNIFICANT CHANGE UP
-  AMPICILLIN: SIGNIFICANT CHANGE UP
-  AZTREONAM: SIGNIFICANT CHANGE UP
-  AZTREONAM: SIGNIFICANT CHANGE UP
-  CEFAZOLIN: SIGNIFICANT CHANGE UP
-  CEFAZOLIN: SIGNIFICANT CHANGE UP
-  CEFEPIME: SIGNIFICANT CHANGE UP
-  CEFEPIME: SIGNIFICANT CHANGE UP
-  CEFTRIAXONE: SIGNIFICANT CHANGE UP
-  CEFTRIAXONE: SIGNIFICANT CHANGE UP
-  CEFUROXIME: SIGNIFICANT CHANGE UP
-  CEFUROXIME: SIGNIFICANT CHANGE UP
-  CIPROFLOXACIN: SIGNIFICANT CHANGE UP
-  CIPROFLOXACIN: SIGNIFICANT CHANGE UP
-  ERTAPENEM: SIGNIFICANT CHANGE UP
-  ERTAPENEM: SIGNIFICANT CHANGE UP
-  GENTAMICIN: SIGNIFICANT CHANGE UP
-  GENTAMICIN: SIGNIFICANT CHANGE UP
-  IMIPENEM: SIGNIFICANT CHANGE UP
-  IMIPENEM: SIGNIFICANT CHANGE UP
-  LEVOFLOXACIN: SIGNIFICANT CHANGE UP
-  LEVOFLOXACIN: SIGNIFICANT CHANGE UP
-  MEROPENEM: SIGNIFICANT CHANGE UP
-  MEROPENEM: SIGNIFICANT CHANGE UP
-  NITROFURANTOIN: SIGNIFICANT CHANGE UP
-  NITROFURANTOIN: SIGNIFICANT CHANGE UP
-  PIPERACILLIN/TAZOBACTAM: SIGNIFICANT CHANGE UP
-  PIPERACILLIN/TAZOBACTAM: SIGNIFICANT CHANGE UP
-  TOBRAMYCIN: SIGNIFICANT CHANGE UP
-  TOBRAMYCIN: SIGNIFICANT CHANGE UP
-  TRIMETHOPRIM/SULFAMETHOXAZOLE: SIGNIFICANT CHANGE UP
-  TRIMETHOPRIM/SULFAMETHOXAZOLE: SIGNIFICANT CHANGE UP
ANION GAP SERPL CALC-SCNC: 8 MMOL/L — SIGNIFICANT CHANGE UP (ref 5–17)
ANION GAP SERPL CALC-SCNC: 8 MMOL/L — SIGNIFICANT CHANGE UP (ref 5–17)
BUN SERPL-MCNC: 7 MG/DL — SIGNIFICANT CHANGE UP (ref 7–18)
BUN SERPL-MCNC: 7 MG/DL — SIGNIFICANT CHANGE UP (ref 7–18)
CALCIUM SERPL-MCNC: 9.2 MG/DL — SIGNIFICANT CHANGE UP (ref 8.4–10.5)
CALCIUM SERPL-MCNC: 9.2 MG/DL — SIGNIFICANT CHANGE UP (ref 8.4–10.5)
CHLORIDE SERPL-SCNC: 104 MMOL/L — SIGNIFICANT CHANGE UP (ref 96–108)
CHLORIDE SERPL-SCNC: 104 MMOL/L — SIGNIFICANT CHANGE UP (ref 96–108)
CO2 SERPL-SCNC: 26 MMOL/L — SIGNIFICANT CHANGE UP (ref 22–31)
CO2 SERPL-SCNC: 26 MMOL/L — SIGNIFICANT CHANGE UP (ref 22–31)
CREAT SERPL-MCNC: 0.78 MG/DL — SIGNIFICANT CHANGE UP (ref 0.5–1.3)
CREAT SERPL-MCNC: 0.78 MG/DL — SIGNIFICANT CHANGE UP (ref 0.5–1.3)
CULTURE RESULTS: ABNORMAL
EGFR: 74 ML/MIN/1.73M2 — SIGNIFICANT CHANGE UP
EGFR: 74 ML/MIN/1.73M2 — SIGNIFICANT CHANGE UP
GAMMA INTERFERON BACKGROUND BLD IA-ACNC: 0.03 IU/ML — SIGNIFICANT CHANGE UP
GAMMA INTERFERON BACKGROUND BLD IA-ACNC: 0.03 IU/ML — SIGNIFICANT CHANGE UP
GLUCOSE SERPL-MCNC: 95 MG/DL — SIGNIFICANT CHANGE UP (ref 70–99)
GLUCOSE SERPL-MCNC: 95 MG/DL — SIGNIFICANT CHANGE UP (ref 70–99)
HCT VFR BLD CALC: 32.5 % — LOW (ref 34.5–45)
HCT VFR BLD CALC: 32.5 % — LOW (ref 34.5–45)
HGB BLD-MCNC: 10.7 G/DL — LOW (ref 11.5–15.5)
HGB BLD-MCNC: 10.7 G/DL — LOW (ref 11.5–15.5)
M TB IFN-G BLD-IMP: NEGATIVE — SIGNIFICANT CHANGE UP
M TB IFN-G BLD-IMP: NEGATIVE — SIGNIFICANT CHANGE UP
M TB IFN-G CD4+ BCKGRND COR BLD-ACNC: -0.01 IU/ML — SIGNIFICANT CHANGE UP
M TB IFN-G CD4+ BCKGRND COR BLD-ACNC: -0.01 IU/ML — SIGNIFICANT CHANGE UP
M TB IFN-G CD4+CD8+ BCKGRND COR BLD-ACNC: 0 IU/ML — SIGNIFICANT CHANGE UP
M TB IFN-G CD4+CD8+ BCKGRND COR BLD-ACNC: 0 IU/ML — SIGNIFICANT CHANGE UP
MAGNESIUM SERPL-MCNC: 1.8 MG/DL — SIGNIFICANT CHANGE UP (ref 1.6–2.6)
MAGNESIUM SERPL-MCNC: 1.8 MG/DL — SIGNIFICANT CHANGE UP (ref 1.6–2.6)
MCHC RBC-ENTMCNC: 29.3 PG — SIGNIFICANT CHANGE UP (ref 27–34)
MCHC RBC-ENTMCNC: 29.3 PG — SIGNIFICANT CHANGE UP (ref 27–34)
MCHC RBC-ENTMCNC: 32.9 GM/DL — SIGNIFICANT CHANGE UP (ref 32–36)
MCHC RBC-ENTMCNC: 32.9 GM/DL — SIGNIFICANT CHANGE UP (ref 32–36)
MCV RBC AUTO: 89 FL — SIGNIFICANT CHANGE UP (ref 80–100)
MCV RBC AUTO: 89 FL — SIGNIFICANT CHANGE UP (ref 80–100)
METHOD TYPE: SIGNIFICANT CHANGE UP
METHOD TYPE: SIGNIFICANT CHANGE UP
NRBC # BLD: 0 /100 WBCS — SIGNIFICANT CHANGE UP (ref 0–0)
NRBC # BLD: 0 /100 WBCS — SIGNIFICANT CHANGE UP (ref 0–0)
ORGANISM # SPEC MICROSCOPIC CNT: ABNORMAL
PHOSPHATE SERPL-MCNC: 3.6 MG/DL — SIGNIFICANT CHANGE UP (ref 2.5–4.5)
PHOSPHATE SERPL-MCNC: 3.6 MG/DL — SIGNIFICANT CHANGE UP (ref 2.5–4.5)
PLATELET # BLD AUTO: 474 K/UL — HIGH (ref 150–400)
PLATELET # BLD AUTO: 474 K/UL — HIGH (ref 150–400)
POTASSIUM SERPL-MCNC: 3.7 MMOL/L — SIGNIFICANT CHANGE UP (ref 3.5–5.3)
POTASSIUM SERPL-MCNC: 3.7 MMOL/L — SIGNIFICANT CHANGE UP (ref 3.5–5.3)
POTASSIUM SERPL-SCNC: 3.7 MMOL/L — SIGNIFICANT CHANGE UP (ref 3.5–5.3)
POTASSIUM SERPL-SCNC: 3.7 MMOL/L — SIGNIFICANT CHANGE UP (ref 3.5–5.3)
PROCALCITONIN SERPL-MCNC: 9.57 NG/ML — HIGH (ref 0.02–0.1)
PROCALCITONIN SERPL-MCNC: 9.57 NG/ML — HIGH (ref 0.02–0.1)
QUANT TB PLUS MITOGEN MINUS NIL: 3.91 IU/ML — SIGNIFICANT CHANGE UP
QUANT TB PLUS MITOGEN MINUS NIL: 3.91 IU/ML — SIGNIFICANT CHANGE UP
RBC # BLD: 3.65 M/UL — LOW (ref 3.8–5.2)
RBC # BLD: 3.65 M/UL — LOW (ref 3.8–5.2)
RBC # FLD: 14.6 % — HIGH (ref 10.3–14.5)
RBC # FLD: 14.6 % — HIGH (ref 10.3–14.5)
SODIUM SERPL-SCNC: 138 MMOL/L — SIGNIFICANT CHANGE UP (ref 135–145)
SODIUM SERPL-SCNC: 138 MMOL/L — SIGNIFICANT CHANGE UP (ref 135–145)
SPECIMEN SOURCE: SIGNIFICANT CHANGE UP
WBC # BLD: 10.99 K/UL — HIGH (ref 3.8–10.5)
WBC # BLD: 10.99 K/UL — HIGH (ref 3.8–10.5)
WBC # FLD AUTO: 10.99 K/UL — HIGH (ref 3.8–10.5)
WBC # FLD AUTO: 10.99 K/UL — HIGH (ref 3.8–10.5)

## 2023-12-13 PROCEDURE — 71045 X-RAY EXAM CHEST 1 VIEW: CPT

## 2023-12-13 PROCEDURE — 84132 ASSAY OF SERUM POTASSIUM: CPT

## 2023-12-13 PROCEDURE — 83735 ASSAY OF MAGNESIUM: CPT

## 2023-12-13 PROCEDURE — 80048 BASIC METABOLIC PNL TOTAL CA: CPT

## 2023-12-13 PROCEDURE — 87086 URINE CULTURE/COLONY COUNT: CPT

## 2023-12-13 PROCEDURE — 0225U NFCT DS DNA&RNA 21 SARSCOV2: CPT

## 2023-12-13 PROCEDURE — 82330 ASSAY OF CALCIUM: CPT

## 2023-12-13 PROCEDURE — 85652 RBC SED RATE AUTOMATED: CPT

## 2023-12-13 PROCEDURE — 84295 ASSAY OF SERUM SODIUM: CPT

## 2023-12-13 PROCEDURE — 85610 PROTHROMBIN TIME: CPT

## 2023-12-13 PROCEDURE — 87070 CULTURE OTHR SPECIMN AEROBIC: CPT

## 2023-12-13 PROCEDURE — 86480 TB TEST CELL IMMUN MEASURE: CPT

## 2023-12-13 PROCEDURE — 87899 AGENT NOS ASSAY W/OPTIC: CPT

## 2023-12-13 PROCEDURE — 96365 THER/PROPH/DIAG IV INF INIT: CPT

## 2023-12-13 PROCEDURE — 82803 BLOOD GASES ANY COMBINATION: CPT

## 2023-12-13 PROCEDURE — 85730 THROMBOPLASTIN TIME PARTIAL: CPT

## 2023-12-13 PROCEDURE — 87186 SC STD MICRODIL/AGAR DIL: CPT

## 2023-12-13 PROCEDURE — 80053 COMPREHEN METABOLIC PANEL: CPT

## 2023-12-13 PROCEDURE — 85025 COMPLETE CBC W/AUTO DIFF WBC: CPT

## 2023-12-13 PROCEDURE — 36415 COLL VENOUS BLD VENIPUNCTURE: CPT

## 2023-12-13 PROCEDURE — 86140 C-REACTIVE PROTEIN: CPT

## 2023-12-13 PROCEDURE — 83605 ASSAY OF LACTIC ACID: CPT

## 2023-12-13 PROCEDURE — 93005 ELECTROCARDIOGRAM TRACING: CPT

## 2023-12-13 PROCEDURE — 94640 AIRWAY INHALATION TREATMENT: CPT

## 2023-12-13 PROCEDURE — 99285 EMERGENCY DEPT VISIT HI MDM: CPT | Mod: 25

## 2023-12-13 PROCEDURE — 71275 CT ANGIOGRAPHY CHEST: CPT

## 2023-12-13 PROCEDURE — 87449 NOS EACH ORGANISM AG IA: CPT

## 2023-12-13 PROCEDURE — 81001 URINALYSIS AUTO W/SCOPE: CPT

## 2023-12-13 PROCEDURE — 84145 PROCALCITONIN (PCT): CPT

## 2023-12-13 PROCEDURE — 84100 ASSAY OF PHOSPHORUS: CPT

## 2023-12-13 PROCEDURE — 84484 ASSAY OF TROPONIN QUANT: CPT

## 2023-12-13 PROCEDURE — 85027 COMPLETE CBC AUTOMATED: CPT

## 2023-12-13 PROCEDURE — 87040 BLOOD CULTURE FOR BACTERIA: CPT

## 2023-12-13 PROCEDURE — 93306 TTE W/DOPPLER COMPLETE: CPT

## 2023-12-13 PROCEDURE — 83880 ASSAY OF NATRIURETIC PEPTIDE: CPT

## 2023-12-13 RX ORDER — MONTELUKAST 4 MG/1
1 TABLET, CHEWABLE ORAL
Qty: 30 | Refills: 0
Start: 2023-12-13 | End: 2024-01-11

## 2023-12-13 RX ORDER — LEVOFLOXACIN 5 MG/ML
1 INJECTION, SOLUTION INTRAVENOUS
Qty: 5 | Refills: 0
Start: 2023-12-13 | End: 2023-12-17

## 2023-12-13 RX ORDER — ALBUTEROL 90 UG/1
2 AEROSOL, METERED ORAL
Qty: 1 | Refills: 0
Start: 2023-12-13

## 2023-12-13 RX ADMIN — Medication 3 MILLILITER(S): at 03:06

## 2023-12-13 RX ADMIN — Medication 100 MILLIGRAM(S): at 05:35

## 2023-12-13 NOTE — DISCHARGE NOTE PROVIDER - CARE PROVIDER_API CALL
HEYDI MARIN, ARIC V  [address]  [place] [address]  Phone: [phone]  Fax: [phone])-  Follow Up Time: 1 week   HEYDI MARIN, ARIC V  [address]  [place] [address]  Phone: ([phone]  Fax: ()-  Follow Up Time: 1 week

## 2023-12-13 NOTE — DISCHARGE NOTE PROVIDER - PROVIDER TOKENS
PROVIDER:[TOKEN:[[id number]],FOLLOWUP:[1 week]] PROVIDER:[TOKEN:[[id number]],FOLLOWUP:[1 week]]

## 2023-12-13 NOTE — PROGRESS NOTE ADULT - SUBJECTIVE AND OBJECTIVE BOX
MARILEE ESPINOZA  MR# [id number]  83yFemale        Patient is a 83y old  Female who presents with a chief complaint of sepsis 2/2 PNA and UTI (13 Dec 2023 11:33)      INTERVAL HPI/OVERNIGHT EVENTS:  Patient seen and examined at bedside. No notations of chest pain, palpitation, SOB, orthopnea, nausea, vomiting or abdominal pain.    ALLERGIES  No Known Allergies      MEDICATIONS  acetaminophen     Tablet .. 650 milliGRAM(s) Oral every 6 hours PRN Temp greater or equal to 38C (100.4F), Mild Pain (1 - 3)  albuterol/ipratropium for Nebulization 3 milliLiter(s) Nebulizer every 6 hours  azithromycin  IVPB 500 milliGRAM(s) IV Intermittent every 24 hours  benzocaine/menthol Lozenge 1 Lozenge Oral every 4 hours PRN Sore Throat  benzonatate 100 milliGRAM(s) Oral three times a day  cefTRIAXone   IVPB 1000 milliGRAM(s) IV Intermittent every 24 hours  enoxaparin Injectable 40 milliGRAM(s) SubCutaneous every 24 hours  guaiFENesin Oral Liquid (Sugar-Free) 100 milliGRAM(s) Oral every 6 hours PRN Cough  influenza  Vaccine (HIGH DOSE) 0.7 milliLiter(s) IntraMuscular once  melatonin 3 milliGRAM(s) Oral at bedtime  ondansetron Injectable 4 milliGRAM(s) IV Push every 6 hours PRN Nausea              REVIEW OF SYSTEMS:  CONSTITUTIONAL: No fever, weight loss, or fatigue  EYES: No eye pain, visual disturbances, or discharge  ENT:  No difficulty hearing, tinnitus, vertigo; No sinus or throat pain  NECK: No pain or stiffness  RESPIRATORY: No cough, wheezing, chills or hemoptysis; No Shortness of Breath  CARDIOVASCULAR: No chest pain, palpitations, passing out, dizziness, or leg swelling  GASTROINTESTINAL: No abdominal or epigastric pain. No nausea, vomiting, or hematemesis; No diarrhea or constipation. No melena or hematochezia.  GENITOURINARY: No dysuria, frequency, hematuria, or incontinence  NEUROLOGICAL: No headaches, memory loss, loss of strength, numbness, or tremors  SKIN: No itching, burning, rashes, or lesions   LYMPH Nodes: No enlarged glands  ENDOCRINE: No heat or cold intolerance; No hair loss  MUSCULOSKELETAL: No joint pain or swelling; No muscle, back, or extremity pain  PSYCHIATRIC: No depression, anxiety, mood swings, or difficulty sleeping  HEME/LYMPH: No easy bruising, or bleeding gums  ALLERGY AND IMMUNOLOGIC: No hives or eczema	    [ ] All others negative	  [ ] Unable to obtain      T(C): 36.7 (12-13-23 @ 05:14), Max: 36.9 (12-12-23 @ 14:02)  T(F): 98 (12-13-23 @ 05:14), Max: 98.5 (12-12-23 @ 14:02)  HR: 89 (12-13-23 @ 05:14) (82 - 93)  BP: 123/72 (12-13-23 @ 05:14) (105/68 - 123/72)  RR: 18 (12-13-23 @ 05:14) (18 - 18)  SpO2: 94% (12-13-23 @ 05:14) (93% - 94%)  Wt(kg): --    I&O's Summary        PHYSICAL EXAM:  A X O x  HEAD:  Atraumatic, Normocephalic  EYES: EOMI, PERRLA, conjunctiva and sclera clear  NECK: Supple, No JVD, Normal thyroid  Resp: CTAB, No crackles, wheezing,   CVS: Regular rate and rhythm; No discernable murmurs, rubs, or gallops  ABD: Soft, Nontender, Nondistended; Bowel sounds present  EXTREMITIES:  2+ Peripheral Pulses, No edema  LYMPH: No dicernable lymphadenopathy noted  GENERAL: NAD, well-groomed, well-developed      LABS:                        10.7   10.99 )-----------( 474      ( 13 Dec 2023 07:35 )             32.5     12-13    138  |  104  |  7   ----------------------------<  95  3.7   |  26  |  0.78    Ca    9.2      13 Dec 2023 07:35  Phos  3.6     12-13  Mg     1.8     12-13    TPro  7.2  /  Alb  2.1<L>  /  TBili  0.8  /  DBili  x   /  AST  24  /  ALT  37  /  AlkPhos  167<H>  12-12      Urinalysis Basic - ( 13 Dec 2023 07:35 )    Color: x / Appearance: x / SG: x / pH: x  Gluc: 95 mg/dL / Ketone: x  / Bili: x / Urobili: x   Blood: x / Protein: x / Nitrite: x   Leuk Esterase: x / RBC: x / WBC x   Sq Epi: x / Non Sq Epi: x / Bacteria: x      CAPILLARY BLOOD GLUCOSE          Troponins:  ProBNP:  Lipid Profile:   HgA1c:  TSH:           RADIOLOGY & ADDITIONAL TESTS:    Imaging Personally Reviewed:  [ ] YES  [ ] NO      Consultant(s) Notes Reviewed:  [x ] YES  [ ] NO    Care Discussed with Consultants/Other Providers [ x] YES  [ ] NO          PAST MEDICAL & SURGICAL HISTORY:  CAD (coronary artery disease)      HTN (hypertension)            Pneumonia due to infectious organism    Handoff    MEWS Score    CAD (coronary artery disease)    HTN (hypertension)    Pneumonia    Sepsis due to pneumonia    Pneumonia    HTN (hypertension)    Prophylactic measure    Dyspnea    Acute UTI    Chest pain    Sepsis due to pneumonia    Discharge planning issues    W/DIFF BREATHING,CHEST PAIN    Hypoxia    Fever    Acute UTI    SysAdmin_VisitLink             MARILEE ESPINOZA  MR# [id number]  83yFemale        Patient is a 83y old  Female who presents with a chief complaint of sepsis 2/2 PNA and UTI (13 Dec 2023 11:33)      INTERVAL HPI/OVERNIGHT EVENTS:  Patient seen and examined at bedside. No notations of chest pain, palpitation, SOB, orthopnea, nausea, vomiting or abdominal pain.    ALLERGIES  No Known Allergies      MEDICATIONS  acetaminophen     Tablet .. 650 milliGRAM(s) Oral every 6 hours PRN Temp greater or equal to 38C (100.4F), Mild Pain (1 - 3)  albuterol/ipratropium for Nebulization 3 milliLiter(s) Nebulizer every 6 hours  azithromycin  IVPB 500 milliGRAM(s) IV Intermittent every 24 hours  benzocaine/menthol Lozenge 1 Lozenge Oral every 4 hours PRN Sore Throat  benzonatate 100 milliGRAM(s) Oral three times a day  cefTRIAXone   IVPB 1000 milliGRAM(s) IV Intermittent every 24 hours  enoxaparin Injectable 40 milliGRAM(s) SubCutaneous every 24 hours  guaiFENesin Oral Liquid (Sugar-Free) 100 milliGRAM(s) Oral every 6 hours PRN Cough  influenza  Vaccine (HIGH DOSE) 0.7 milliLiter(s) IntraMuscular once  melatonin 3 milliGRAM(s) Oral at bedtime  ondansetron Injectable 4 milliGRAM(s) IV Push every 6 hours PRN Nausea              REVIEW OF SYSTEMS:  CONSTITUTIONAL: No fever, weight loss, or fatigue  EYES: No eye pain, visual disturbances, or discharge  ENT:  No difficulty hearing, tinnitus, vertigo; No sinus or throat pain  NECK: No pain or stiffness  RESPIRATORY: No cough, wheezing, chills or hemoptysis; No Shortness of Breath  CARDIOVASCULAR: No chest pain, palpitations, passing out, dizziness, or leg swelling  GASTROINTESTINAL: No abdominal or epigastric pain. No nausea, vomiting, or hematemesis; No diarrhea or constipation. No melena or hematochezia.  GENITOURINARY: No dysuria, frequency, hematuria, or incontinence  NEUROLOGICAL: No headaches, memory loss, loss of strength, numbness, or tremors  SKIN: No itching, burning, rashes, or lesions   LYMPH Nodes: No enlarged glands  ENDOCRINE: No heat or cold intolerance; No hair loss  MUSCULOSKELETAL: No joint pain or swelling; No muscle, back, or extremity pain  PSYCHIATRIC: No depression, anxiety, mood swings, or difficulty sleeping  HEME/LYMPH: No easy bruising, or bleeding gums  ALLERGY AND IMMUNOLOGIC: No hives or eczema	    [ ] All others negative	  [ ] Unable to obtain      T(C): 36.7 (12-13-23 @ 05:14), Max: 36.9 (12-12-23 @ 14:02)  T(F): 98 (12-13-23 @ 05:14), Max: 98.5 (12-12-23 @ 14:02)  HR: 89 (12-13-23 @ 05:14) (82 - 93)  BP: 123/72 (12-13-23 @ 05:14) (105/68 - 123/72)  RR: 18 (12-13-23 @ 05:14) (18 - 18)  SpO2: 94% (12-13-23 @ 05:14) (93% - 94%)  Wt(kg): --    I&O's Summary        PHYSICAL EXAM:  A X O x  HEAD:  Atraumatic, Normocephalic  EYES: EOMI, PERRLA, conjunctiva and sclera clear  NECK: Supple, No JVD, Normal thyroid  Resp: CTAB, No crackles, wheezing,   CVS: Regular rate and rhythm; No discernable murmurs, rubs, or gallops  ABD: Soft, Nontender, Nondistended; Bowel sounds present  EXTREMITIES:  2+ Peripheral Pulses, No edema  LYMPH: No dicernable lymphadenopathy noted  GENERAL: NAD, well-groomed, well-developed      LABS:                        10.7   10.99 )-----------( 474      ( 13 Dec 2023 07:35 )             32.5     12-13    138  |  104  |  7   ----------------------------<  95  3.7   |  26  |  0.78    Ca    9.2      13 Dec 2023 07:35  Phos  3.6     12-13  Mg     1.8     12-13    TPro  7.2  /  Alb  2.1<L>  /  TBili  0.8  /  DBili  x   /  AST  24  /  ALT  37  /  AlkPhos  167<H>  12-12      Urinalysis Basic - ( 13 Dec 2023 07:35 )    Color: x / Appearance: x / SG: x / pH: x  Gluc: 95 mg/dL / Ketone: x  / Bili: x / Urobili: x   Blood: x / Protein: x / Nitrite: x   Leuk Esterase: x / RBC: x / WBC x   Sq Epi: x / Non Sq Epi: x / Bacteria: x      CAPILLARY BLOOD GLUCOSE          Troponins:  ProBNP:  Lipid Profile:   HgA1c:  TSH:           RADIOLOGY & ADDITIONAL TESTS:    Imaging Personally Reviewed:  [ ] YES  [ ] NO      Consultant(s) Notes Reviewed:  [x ] YES  [ ] NO    Care Discussed with Consultants/Other Providers [ x] YES  [ ] NO          PAST MEDICAL & SURGICAL HISTORY:  CAD (coronary artery disease)      HTN (hypertension)            Pneumonia due to infectious organism    Handoff    MEWS Score    CAD (coronary artery disease)    HTN (hypertension)    Pneumonia    Sepsis due to pneumonia    Pneumonia    HTN (hypertension)    Prophylactic measure    Dyspnea    Acute UTI    Chest pain    Sepsis due to pneumonia    Discharge planning issues    W/DIFF BREATHING,CHEST PAIN    Hypoxia    Fever    Acute UTI    SysAdmin_VisitLink

## 2023-12-13 NOTE — PROGRESS NOTE ADULT - ATTENDING COMMENTS
Pt's presentation appearing consistent with reactive airway disease (with report of long-standing h/o asthmatic COPD) w/ negative RVP thus far and no reports of any expectorations. However given [place] ([place], where pt lives) will consider r/o T.b. At present will initiate rx for CAP (given b/l consolidations on cxr)  / UTI with IV zithro / rocephin. Pulm consult (Dr Daly) to follow.    12/12/23 - Considering pt has no concerning lesions on CXR for T.b. a QuantiFeron test alone should suffice. Pt reporting subjective improvement. Can d/c home on PO abx to be completed (Augmentin x 5 days). F/U procalcitonin level.    12/12/23 - Pt noted with subjective improvement yet her cough continues to appear wet with scant expectorations. Pt is sating 100% on RA. She will be d/douglas home on po levaquin 500mg daily w/ probiotics and mucolytic support. Pt is to continue Montelukast plus a rescue albuterol inhaler as needed. Pt is to follow up with a PMD in 10 days given expected radiographic delay in improvement. Pt's presentation appearing consistent with reactive airway disease (with report of long-standing h/o asthmatic COPD) w/ negative RVP thus far and no reports of any expectorations. However given her recent travel [place] ([place], where pt lives) will consider r/o T.b. At present will initiate rx for CAP (given b/l consolidations on cxr)  / UTI with IV zithro / rocephin. Pulm consult (Dr Daly) to follow.    12/12/23 - Considering pt has no concerning lesions on CXR for T.b. a QuantiFeron test alone should suffice. Pt reporting subjective improvement. Can d/c home on PO abx to be completed (Augmentin x 5 days). F/U procalcitonin level.    12/12/23 - Pt noted with subjective improvement yet her cough continues to appear wet with scant expectorations. Pt is sating 100% on RA. She will be d/douglas home on po levaquin 500mg daily w/ probiotics and mucolytic support. Pt is to continue Montelukast plus a rescue albuterol inhaler as needed. Pt is to follow up with a PMD in 10 days given expected radiographic delay in improvement.

## 2023-12-13 NOTE — PROGRESS NOTE ADULT - SUBJECTIVE AND OBJECTIVE BOX
Date of Service 12-13-23 @ 11:33    CHIEF COMPLAINT:Patient is a 83y old  Female who presents with a chief complaint of sepsis 2/2 PNA and UTI.Pt appears comfortable.    	  REVIEW OF SYSTEMS:  CONSTITUTIONAL: No fever, weight loss, or fatigue  EYES: No eye pain, visual disturbances, or discharge  ENT:  No difficulty hearing, tinnitus, vertigo; No sinus or throat pain  NECK: No pain or stiffness  RESPIRATORY: No cough, wheezing, chills or hemoptysis; No Shortness of Breath  CARDIOVASCULAR: No chest pain, palpitations, passing out, dizziness, or leg swelling  GASTROINTESTINAL: No abdominal or epigastric pain. No nausea, vomiting, or hematemesis; No diarrhea or constipation. No melena or hematochezia.  GENITOURINARY: No dysuria, frequency, hematuria, or incontinence  NEUROLOGICAL: No headaches, memory loss, loss of strength, numbness, or tremors  SKIN: No itching, burning, rashes, or lesions   LYMPH Nodes: No enlarged glands  ENDOCRINE: No heat or cold intolerance; No hair loss  MUSCULOSKELETAL: No joint pain or swelling; No muscle, back, or extremity pain  PSYCHIATRIC: No depression, anxiety, mood swings, or difficulty sleeping  HEME/LYMPH: No easy bruising, or bleeding gums  ALLERGY AND IMMUNOLOGIC: No hives or eczema	        PHYSICAL EXAM:  T(C): 36.7 (12-13-23 @ 05:14), Max: 36.9 (12-12-23 @ 14:02)  HR: 89 (12-13-23 @ 05:14) (82 - 93)  BP: 123/72 (12-13-23 @ 05:14) (105/68 - 123/72)  RR: 18 (12-13-23 @ 05:14) (18 - 18)  SpO2: 94% (12-13-23 @ 05:14) (93% - 94%)  Wt(kg): --  I&O's Summary      Appearance: Normal	  HEENT:   Normal oral mucosa, PERRL, EOMI	  Lymphatic: No lymphadenopathy  Cardiovascular: Normal S1 S2, No JVD, No murmurs, No edema  Respiratory: Lungs clear to auscultation	  Psychiatry: A & O x 3, Mood & affect appropriate  Gastrointestinal:  Soft, Non-tender, + BS	  Skin: No rashes, No ecchymoses, No cyanosis	  Neurologic: Non-focal  Extremities: Normal range of motion, No clubbing, cyanosis or edema  Vascular: Peripheral pulses palpable 2+ bilaterally    MEDICATIONS  (STANDING):  albuterol/ipratropium for Nebulization 3 milliLiter(s) Nebulizer every 6 hours  azithromycin  IVPB 500 milliGRAM(s) IV Intermittent every 24 hours  benzonatate 100 milliGRAM(s) Oral three times a day  cefTRIAXone   IVPB 1000 milliGRAM(s) IV Intermittent every 24 hours  enoxaparin Injectable 40 milliGRAM(s) SubCutaneous every 24 hours  influenza  Vaccine (HIGH DOSE) 0.7 milliLiter(s) IntraMuscular once  melatonin 3 milliGRAM(s) Oral at bedtime      	  	  LABS:	 	    Troponin I, High Sensitivity Result: 41.5 ng/L (12-10 @ 20:20)  Troponin I, High Sensitivity Result: 38.3 ng/L (12-10 @ 14:45)                            10.7   10.99 )-----------( 474      ( 13 Dec 2023 07:35 )             32.5     12-13    138  |  104  |  7   ----------------------------<  95  3.7   |  26  |  0.78    Ca    9.2      13 Dec 2023 07:35  Phos  3.6     12-13  Mg     1.8     12-13    TPro  7.2  /  Alb  2.1<L>  /  TBili  0.8  /  DBili  x   /  AST  24  /  ALT  37  /  AlkPhos  167<H>  12-12    < from: Transthoracic Echocardiogram (12.12.23 @ 12:18) >  OBSERVATIONS:  Mitral Valve: Normal mitral valve. Mild mitral  regurgitation.  Aortic Root: Aortic Root: 3.2 cm.    Aortic Valve: Calcified trileaflet aortic valve with  decreased opening. Peak transaortic valve gradient equals  15.5 mm Hg, estimated aortic valve area equals 1.7 sqcm (by  continuity equation), consistent with mild aortic stenosis.  Mild aortic regurgitation.  Left Atrium: Normal left atrium.  LA volume index = 22  cc/m2.  Left Ventricle: Normal Left Ventricular Systolic Function,  (EF = 55 to 60%) Normal left ventricular internal  dimensions and wall thicknesses. Grade I diastolic  dysfunction (Impaired relaxation, mild).  Right Heart:Normal right atrium. Normal right ventricular  size and systolic function (TAPSE 2.0 cm). There is trace  tricuspid regurgitation. There is trace pulmonic  regurgitation.  Pericardium/PleuraNormal pericardium with no pericardial  effusion.  Hemodynamic: RV systolic pressure is normal at  35 mm Hg.    < end of copied text >

## 2023-12-13 NOTE — PROGRESS NOTE ADULT - PROBLEM SELECTOR PLAN 2
fever, leukocytosis, and tachycardia   resolved
p/w dyspnea and cough  worsening cough for past 10 days however cough present for 6 months at least  hx of TB exposure many years ago , no personal hx of TB   f/u TB quantiferone test  f/u CTA to rule out for PE [recent hx of long- haul flight]   rest of plan as above [PNA]
p/w dysuria, frequency and urgency   U/A positive  Urine culture grew E. Coli  f/u UCx sensitivities  c/w Rocephin
fever, leukocytosis, and tachycardia   resolved
p/w dysuria, frequency and urgency   U/A positive  Urine culture grew E. Coli  f/u UCx sensitivities  c/w Rocephin

## 2023-12-13 NOTE — PROGRESS NOTE ADULT - PROBLEM SELECTOR PLAN 4
p/w dysuria, frequency and urgency   U/A positive  c/w Rocephin   f/u urine culture
chest pain associated with cough  trop x2 neg   EKG NSR  murmur noted on exam  f/u Echo  Cardio Dr. Oviedo
chest pain associated with cough  trop x2 neg   EKG NSR  murmur noted on exam  f/u Echo  Cardio Dr. Oviedo consulted
p/w dysuria, frequency and urgency   U/A positive  c/w Rocephin   f/u urine culture
chest pain associated with cough  trop x2 neg   EKG NSR  murmur noted on exam  f/u Echo  Cardio Dr. Oviedo

## 2023-12-13 NOTE — PROGRESS NOTE ADULT - PROBLEM SELECTOR PLAN 6
DVT- Lovenox
hx of HTN on Nebivolol 10 mg daily   will hold home meds for now as normotensive  monitor BP
DVT- Lovenox
hx of HTN on Nebivolol 10 mg daily   will hold home meds for now as normotensive  monitor BP
DVT- Lovenox

## 2023-12-13 NOTE — PROGRESS NOTE ADULT - PROBLEM SELECTOR PLAN 7
Pt. is from home, ambulates with walker  Discharge likely back to home when medically optimized
Pt. is from home, ambulates with walker  Discharge likely back to home when medically optimized
DVT- Lovenox
DVT- Lovenox

## 2023-12-13 NOTE — DISCHARGE NOTE PROVIDER - NSDCCPCAREPLAN_GEN_ALL_CORE_FT
PRINCIPAL DISCHARGE DIAGNOSIS  Diagnosis: Sepsis due to pneumonia  Assessment and Plan of Treatment: You presented to hospital with fever, rapid heart rate, and elevated white blood count meeting sepsis criteria.  Your chest X-Ray      SECONDARY DISCHARGE DIAGNOSES  Diagnosis: Hypoxia  Assessment and Plan of Treatment:     Diagnosis: Fever  Assessment and Plan of Treatment:      PRINCIPAL DISCHARGE DIAGNOSIS  Diagnosis: Sepsis due to pneumonia  Assessment and Plan of Treatment: You presented to hospital with fever, rapid heart rate, and elevated white blood count meeting sepsis criteria.  You reported cough associated with chest discomfort,  Your chest X-Ray showed bilateral infiltrates.  Your chest CT showed pneumonia with no pulmonary embolus.  You were treated with intravenous antibiotics, Oxygen  and bronchodilators with good response.    -Please complete antibiotic course as prescribed  -Please follow up with your primary care doctor within one week      SECONDARY DISCHARGE DIAGNOSES  Diagnosis: Acute UTI  Assessment and Plan of Treatment: You presented with burning and frequency urination.  Your urinalysis was positive and your urine culture grew bacteria E. Coli.  You were treated with antibiotics with resolution of your symptoms.  -Please complete antibiotic course as prescribed  -Please maintain adequate hydration  -Follow up with primary doctor    Diagnosis: Hypoxia  Assessment and Plan of Treatment: Your oxygen level was sub optimal on admission due to pneumonia.  You required oxygen supplement during the acute phase.  You are now off Oxygen with adquate oxygenation.  -Deep breathing eand coughing exercises encouraged  -Rest periods between activities

## 2023-12-13 NOTE — DISCHARGE NOTE NURSING/CASE MANAGEMENT/SOCIAL WORK - PATIENT PORTAL LINK FT
You can access the FollowMyHealth Patient Portal offered by [facility] by registering at the following website: [email or web address]. By joining [employer]’s FollowMyHealth portal, you will also be able to view your health information using other applications (apps) compatible with our system. You can access the FollowMyHealth Patient Portal offered by [facility] by registering at the following website: [email or web address]. By joining [employer]’s FollowMyHealth portal, you will also be able to view your health information using other applications (apps) compatible with our system.

## 2023-12-13 NOTE — DISCHARGE NOTE PROVIDER - HOSPITAL COURSE
75 yrs old F, from home, ambulating with walker, pmhx of CAD, ?MI 7 yrs ago, presented with chronic cough now worsening associated with chest pain, fever, chills, urinary symptoms, and myalgia.   Pt had traveled from [place] about 2 weeks ago, she was diagnosed with PNA and completed course of Abx at that time. Pt endorsed being exposed to TB [her mother] in the past however did not have any personal history of TB, denied sick contacts. CXR revealed B/L infiltrates.  CTA chest revealed No acute pulmonary embolism.  Extensive multifocal consolidations bilaterally concerning for multifocal pneumonia.  UA+, urine culture grew E. Coli.  Pt. admitted to medicine for sepsis 2/2 PNA and UTI, treated with Azithro and Ceftriaxone.               75 yrs old F, from home, ambulating with walker, pmhx of CAD, ?MI 7 yrs ago, presented with chronic cough now worsening associated with chest pain, fever, chills, urinary symptoms, and myalgia.   Pt had traveled from [place] about 2 weeks ago, she was diagnosed with PNA and completed course of Abx at that time. Pt endorsed being exposed to TB [her mother] in the past however did not have any personal history of TB, denied sick contacts. CXR revealed B/L infiltrates.  CTA chest revealed No acute pulmonary embolism.  Extensive multifocal consolidations bilaterally concerning for multifocal pneumonia.  UA+, urine culture grew E. Coli.  Pt. admitted to medicine for sepsis 2/2 PNA and UTI, treated with Azithro and Ceftriaxone.               75 yrs old F, from home, ambulating with walker, pmhx of CAD, ?MI 7 yrs ago, presented with chronic cough now worsening associated with chest pain, fever, chills, urinary symptoms, and myalgia.   Pt had traveled from [place] about 2 weeks ago, she was diagnosed with PNA and completed course of Abx at that time. Pt endorsed being exposed to TB [her mother] in the past however did not have any personal history of TB, denied sick contacts. CXR revealed B/L infiltrates.  CTA chest revealed No acute pulmonary embolism.  Extensive multifocal consolidations bilaterally concerning for multifocal pneumonia.  UA+, urine culture grew E. Coli.  Pt. admitted to medicine for sepsis 2/2 PNA and UTI, treated with Azithro and Ceftriaxone, followed by pulm, treated with bronchodilation and cough suppressants with improved symptomatology.    Quantiferon...... 75 yrs old F, from home, ambulating with walker, pmhx of CAD, ?MI 7 yrs ago, presented with chronic cough now worsening associated with chest pain, fever, chills, urinary symptoms, and myalgia.   Pt had traveled from [place] about 2 weeks ago, she was diagnosed with PNA and completed course of Abx at that time. Pt endorsed being exposed to TB [her mother] in the past however did not have any personal history of TB, denied sick contacts. CXR revealed B/L infiltrates.  CTA chest revealed No acute pulmonary embolism.  Extensive multifocal consolidations bilaterally concerning for multifocal pneumonia.  UA+, urine culture grew E. Coli.  Pt. admitted to medicine for sepsis 2/2 PNA and UTI, treated with Azithro and Ceftriaxone, followed by pulm, treated with bronchodilation and cough suppressants with improved symptomatology.    Quantiferon...... 75 yrs old F, from home, ambulating with walker, pmhx of CAD, ?MI 7 yrs ago, presented with chronic cough now worsening associated with chest pain, fever, chills, urinary symptoms, and myalgia.   Pt had traveled from [place] about 2 weeks ago, she was diagnosed with PNA and completed course of Abx at that time. Pt endorsed being exposed to TB [her mother] in the past however did not have any personal history of TB, denied sick contacts. CXR revealed B/L infiltrates.  CTA chest revealed No acute pulmonary embolism.  Extensive multifocal consolidations bilaterally concerning for multifocal pneumonia.  UA+, urine culture grew E. Coli.  Pt. admitted to medicine for sepsis 2/2 PNA and UTI, treated with Azithro and Ceftriaxone, followed by pulm, treated with bronchodilation and cough suppressants with improved symptomatology.    Pt. is medically optimized for discharge to home with PO Augmentin x 5 days.   75 yrs old F, from home, ambulating with walker, pmhx of CAD, ?MI 7 yrs ago, presented with chronic cough now worsening associated with chest pain, fever, chills, urinary symptoms, and myalgia.   Pt had traveled from [place] about 2 weeks ago, she was diagnosed with PNA and completed course of Abx at that time. Pt endorsed being exposed to TB [her mother] in the past however did not have any personal history of TB, denied sick contacts. CXR revealed B/L infiltrates.  CTA chest revealed No acute pulmonary embolism.  Extensive multifocal consolidations bilaterally concerning for multifocal pneumonia.  UA+, urine culture grew E. Coli.  Pt. admitted to medicine for sepsis 2/2 PNA and UTI, treated with Azithro and Ceftriaxone, followed by pulm, treated with bronchodilation and cough suppressants with improved symptomatology.    Pt. is medically optimized for discharge to home with PO Augmentin x 5 days.

## 2023-12-13 NOTE — PROGRESS NOTE ADULT - PROBLEM SELECTOR PLAN 3
p/w dyspnea and cough  worsening cough for past 10 days however cough present for 6 months at least  hx of TB exposure many years ago , no personal hx of TB   f/u TB quantiferone test  CTA negative for PE
p/w dysuria, frequency and urgency   U/A positive  c/w Rocephin   f/u urine culture
p/w dyspnea and cough  worsening cough for past 10 days however cough present for 6 months at least  hx of TB exposure many years ago , no personal hx of TB   f/u TB quantiferone test  CTA negative for PE

## 2023-12-13 NOTE — PROGRESS NOTE ADULT - PROBLEM SELECTOR PROBLEM 6
HTN (hypertension)
Prophylactic measure
HTN (hypertension)
Prophylactic measure
Prophylactic measure

## 2023-12-13 NOTE — DISCHARGE NOTE NURSING/CASE MANAGEMENT/SOCIAL WORK - NSDCPEFALRISK_GEN_ALL_CORE
For information on Fall & Injury Prevention, visit: [email or web address] OR  [email or web address] OR  https://www.cdc.gov/steadi/patient.html For information on Fall & Injury Prevention, visit: [email or web address] OR  [email or web address]-injury OR  https://www.cdc.gov/steadi/patient.html

## 2023-12-13 NOTE — PROGRESS NOTE ADULT - REASON FOR ADMISSION
sepsis 2/2 PNA and UTI

## 2023-12-13 NOTE — PROGRESS NOTE ADULT - ASSESSMENT
75 yrs old F, from home, ambulating with walker, pmhx of CAD, ?MI 7 yrs ago, presented with cough, fever, chills, urinary symptoms, and myalgia,multifocal pneumonia,UTI.  1.Multifocal pneumonia,UTI-ABX.  2.Echocardiogram as above.  3.CAD-asa,statin.  4.GI and DVT prophylaxis.
75 yrs old F, from home, ambulating with walker, pmhx of CAD, ?MI 7 yrs ago, presented with cough, fever, chills, urinary symptoms, and myalgia,multifocal pneumonia,UTI.  1.Multifocal pneumonia,UTI-ABX.  2.Echocardiogram.  3.CAD-asa,statin.  4.GI and DVT prophylaxis.
75 yrs old F, from home, ambulating with walker, pmhx of CAD, ?MI 7 yrs ago, presented with cough, fever, chills, urinary symptoms, and myalgia. Pt is admitted for pneumonia, UTI and further evaluation of dyspnea including rule out PE. 
75 yrs old F, from home, ambulating with walker, pmhx of CAD, ?MI 7 yrs ago, presented with cough, fever, chills, urinary symptoms, and myalgia. Pt is admitted for pneumonia, UTI and further evaluation of dyspnea including rule out PE. 
75 yrs old F, from home, ambulating with walker, pmhx of CAD, ?MI 7 yrs ago, presented with cough, fever, chills, urinary symptoms, and myalgia. Pt is admitted for pneumonia, UTI and further evaluation of dyspnea including rule out PE.  CTA neg for PE, CXR shows B/L lung infiltrates, CT chest shows multifocal PNA, UA positive, UCx grew E. Coli, B/C negative.   Pt. treated with Azithro and Rocephin at this time, awaiting UCx sensitivities.
75 yrs old F, from home, ambulating with walker, pmhx of CAD, ?MI 7 yrs ago, presented with cough, fever, chills, urinary symptoms, and myalgia. Pt is admitted for pneumonia, UTI and further evaluation of dyspnea including rule out PE.  CTA neg for PE, CXR shows B/L lung infiltrates, CT chest shows multifocal PNA, UA positive, UCx grew E. Coli, B/C negative.   Pt. treated with Azithro and Rocephin at this time, awaiting UCx sensitivities.
75 yrs old F, from home, ambulating with walker, pmhx of CAD, ?MI 7 yrs ago, presented with cough, fever, chills, urinary symptoms, and myalgia. Pt is admitted for pneumonia, UTI and further evaluation of dyspnea including rule out PE.

## 2023-12-13 NOTE — PROGRESS NOTE ADULT - PROVIDER SPECIALTY LIST ADULT
Internal Medicine
Cardiology
Cardiology
Internal Medicine

## 2023-12-13 NOTE — DISCHARGE NOTE PROVIDER - NSDCMRMEDTOKEN_GEN_ALL_CORE_FT
amoxicillin-clavulanate 875 mg-125 mg oral tablet: 875 milligram(s) orally every 12 hours  benzonatate 100 mg oral capsule: 1 cap(s) orally 3 times a day  guaiFENesin 100 mg/5 mL oral liquid: 5 milliliter(s) orally every 6 hours As needed Cough   Albuterol (Eqv-Ventolin HFA) 90 mcg/inh inhalation aerosol: 2 puff(s) inhaled every 6 hours as needed for  shortness of breath and/or wheezing  benzonatate 100 mg oral capsule: 1 cap(s) orally 3 times a day  guaiFENesin 100 mg/5 mL oral liquid: 5 milliliter(s) orally every 6 hours As needed Cough  levoFLOXacin 500 mg oral tablet: 1 tab(s) orally once a day  montelukast 10 mg oral tablet: 1 tab(s) orally once a day

## 2023-12-15 LAB
CULTURE RESULTS: SIGNIFICANT CHANGE UP
SPECIMEN SOURCE: SIGNIFICANT CHANGE UP

## 2024-02-05 ENCOUNTER — EMERGENCY (EMERGENCY)
Facility: HOSPITAL | Age: 84
LOS: 1 days | Discharge: HOME | End: 2024-02-05
Attending: EMERGENCY MEDICINE
Payer: MEDICAID

## 2024-02-05 VITALS
OXYGEN SATURATION: 95 % | RESPIRATION RATE: 17 BRPM | TEMPERATURE: 98 F | DIASTOLIC BLOOD PRESSURE: 73 MMHG | SYSTOLIC BLOOD PRESSURE: 110 MMHG | HEART RATE: 78 BPM

## 2024-02-05 VITALS
OXYGEN SATURATION: 96 % | RESPIRATION RATE: 16 BRPM | HEART RATE: 97 BPM | DIASTOLIC BLOOD PRESSURE: 86 MMHG | SYSTOLIC BLOOD PRESSURE: 130 MMHG | TEMPERATURE: 98 F | WEIGHT: 138.89 LBS

## 2024-02-05 LAB
ALBUMIN SERPL ELPH-MCNC: 3.5 G/DL — SIGNIFICANT CHANGE UP (ref 3.5–5)
ALP SERPL-CCNC: 128 U/L — HIGH (ref 40–120)
ALT FLD-CCNC: 42 U/L DA — SIGNIFICANT CHANGE UP (ref 10–60)
ANION GAP SERPL CALC-SCNC: 5 MMOL/L — SIGNIFICANT CHANGE UP (ref 5–17)
APTT BLD: 33.6 SEC — SIGNIFICANT CHANGE UP (ref 24.5–35.6)
AST SERPL-CCNC: 32 U/L — SIGNIFICANT CHANGE UP (ref 10–40)
BASOPHILS # BLD AUTO: 0.03 K/UL — SIGNIFICANT CHANGE UP (ref 0–0.2)
BASOPHILS NFR BLD AUTO: 0.5 % — SIGNIFICANT CHANGE UP (ref 0–2)
BILIRUB SERPL-MCNC: 0.4 MG/DL — SIGNIFICANT CHANGE UP (ref 0.2–1.2)
BUN SERPL-MCNC: 20 MG/DL — HIGH (ref 7–18)
CALCIUM SERPL-MCNC: 9.9 MG/DL — SIGNIFICANT CHANGE UP (ref 8.4–10.5)
CHLORIDE SERPL-SCNC: 104 MMOL/L — SIGNIFICANT CHANGE UP (ref 96–108)
CO2 SERPL-SCNC: 26 MMOL/L — SIGNIFICANT CHANGE UP (ref 22–31)
CREAT SERPL-MCNC: 0.92 MG/DL — SIGNIFICANT CHANGE UP (ref 0.5–1.3)
EGFR: 61 ML/MIN/1.73M2 — SIGNIFICANT CHANGE UP
EOSINOPHIL # BLD AUTO: 0.05 K/UL — SIGNIFICANT CHANGE UP (ref 0–0.5)
EOSINOPHIL NFR BLD AUTO: 0.8 % — SIGNIFICANT CHANGE UP (ref 0–6)
GLUCOSE SERPL-MCNC: 97 MG/DL — SIGNIFICANT CHANGE UP (ref 70–99)
HCT VFR BLD CALC: 45.4 % — HIGH (ref 34.5–45)
HGB BLD-MCNC: 14.3 G/DL — SIGNIFICANT CHANGE UP (ref 11.5–15.5)
IMM GRANULOCYTES NFR BLD AUTO: 0.2 % — SIGNIFICANT CHANGE UP (ref 0–0.9)
INR BLD: 0.96 RATIO — SIGNIFICANT CHANGE UP (ref 0.85–1.18)
LACTATE SERPL-SCNC: 1.3 MMOL/L — SIGNIFICANT CHANGE UP (ref 0.7–2)
LIDOCAIN IGE QN: 26 U/L — SIGNIFICANT CHANGE UP (ref 13–75)
LYMPHOCYTES # BLD AUTO: 1.83 K/UL — SIGNIFICANT CHANGE UP (ref 1–3.3)
LYMPHOCYTES # BLD AUTO: 29.5 % — SIGNIFICANT CHANGE UP (ref 13–44)
MAGNESIUM SERPL-MCNC: 2.1 MG/DL — SIGNIFICANT CHANGE UP (ref 1.6–2.6)
MCHC RBC-ENTMCNC: 28.9 PG — SIGNIFICANT CHANGE UP (ref 27–34)
MCHC RBC-ENTMCNC: 31.5 GM/DL — LOW (ref 32–36)
MCV RBC AUTO: 91.7 FL — SIGNIFICANT CHANGE UP (ref 80–100)
MONOCYTES # BLD AUTO: 0.51 K/UL — SIGNIFICANT CHANGE UP (ref 0–0.9)
MONOCYTES NFR BLD AUTO: 8.2 % — SIGNIFICANT CHANGE UP (ref 2–14)
NEUTROPHILS # BLD AUTO: 3.77 K/UL — SIGNIFICANT CHANGE UP (ref 1.8–7.4)
NEUTROPHILS NFR BLD AUTO: 60.8 % — SIGNIFICANT CHANGE UP (ref 43–77)
NRBC # BLD: 0 /100 WBCS — SIGNIFICANT CHANGE UP (ref 0–0)
PLATELET # BLD AUTO: 320 K/UL — SIGNIFICANT CHANGE UP (ref 150–400)
POTASSIUM SERPL-MCNC: 4.4 MMOL/L — SIGNIFICANT CHANGE UP (ref 3.5–5.3)
POTASSIUM SERPL-SCNC: 4.4 MMOL/L — SIGNIFICANT CHANGE UP (ref 3.5–5.3)
PROT SERPL-MCNC: 8.2 G/DL — SIGNIFICANT CHANGE UP (ref 6–8.3)
PROTHROM AB SERPL-ACNC: 11 SEC — SIGNIFICANT CHANGE UP (ref 9.5–13)
RBC # BLD: 4.95 M/UL — SIGNIFICANT CHANGE UP (ref 3.8–5.2)
RBC # FLD: 14.6 % — HIGH (ref 10.3–14.5)
SODIUM SERPL-SCNC: 135 MMOL/L — SIGNIFICANT CHANGE UP (ref 135–145)
TROPONIN I, HIGH SENSITIVITY RESULT: 41.8 NG/L — SIGNIFICANT CHANGE UP
WBC # BLD: 6.2 K/UL — SIGNIFICANT CHANGE UP (ref 3.8–10.5)
WBC # FLD AUTO: 6.2 K/UL — SIGNIFICANT CHANGE UP (ref 3.8–10.5)

## 2024-02-05 PROCEDURE — 99284 EMERGENCY DEPT VISIT MOD MDM: CPT

## 2024-02-05 PROCEDURE — 80053 COMPREHEN METABOLIC PANEL: CPT

## 2024-02-05 PROCEDURE — 83690 ASSAY OF LIPASE: CPT

## 2024-02-05 PROCEDURE — 36415 COLL VENOUS BLD VENIPUNCTURE: CPT

## 2024-02-05 PROCEDURE — 85610 PROTHROMBIN TIME: CPT

## 2024-02-05 PROCEDURE — 96374 THER/PROPH/DIAG INJ IV PUSH: CPT

## 2024-02-05 PROCEDURE — 93005 ELECTROCARDIOGRAM TRACING: CPT

## 2024-02-05 PROCEDURE — 96375 TX/PRO/DX INJ NEW DRUG ADDON: CPT

## 2024-02-05 PROCEDURE — 85025 COMPLETE CBC W/AUTO DIFF WBC: CPT

## 2024-02-05 PROCEDURE — 84484 ASSAY OF TROPONIN QUANT: CPT

## 2024-02-05 PROCEDURE — 83735 ASSAY OF MAGNESIUM: CPT

## 2024-02-05 PROCEDURE — 85730 THROMBOPLASTIN TIME PARTIAL: CPT

## 2024-02-05 PROCEDURE — 99284 EMERGENCY DEPT VISIT MOD MDM: CPT | Mod: 25

## 2024-02-05 PROCEDURE — 83605 ASSAY OF LACTIC ACID: CPT

## 2024-02-05 RX ORDER — SODIUM CHLORIDE 9 MG/ML
1000 INJECTION INTRAMUSCULAR; INTRAVENOUS; SUBCUTANEOUS ONCE
Refills: 0 | Status: COMPLETED | OUTPATIENT
Start: 2024-02-05 | End: 2024-02-05

## 2024-02-05 RX ORDER — PANTOPRAZOLE SODIUM 20 MG/1
40 TABLET, DELAYED RELEASE ORAL ONCE
Refills: 0 | Status: COMPLETED | OUTPATIENT
Start: 2024-02-05 | End: 2024-02-05

## 2024-02-05 RX ORDER — SUCRALFATE 1 G
1 TABLET ORAL ONCE
Refills: 0 | Status: COMPLETED | OUTPATIENT
Start: 2024-02-05 | End: 2024-02-05

## 2024-02-05 RX ORDER — ONDANSETRON 8 MG/1
4 TABLET, FILM COATED ORAL ONCE
Refills: 0 | Status: COMPLETED | OUTPATIENT
Start: 2024-02-05 | End: 2024-02-05

## 2024-02-05 RX ORDER — ONDANSETRON 8 MG/1
1 TABLET, FILM COATED ORAL
Qty: 21 | Refills: 0
Start: 2024-02-05 | End: 2024-02-11

## 2024-02-05 RX ORDER — PANTOPRAZOLE SODIUM 20 MG/1
1 TABLET, DELAYED RELEASE ORAL
Qty: 30 | Refills: 0
Start: 2024-02-05 | End: 2024-03-05

## 2024-02-05 RX ADMIN — SODIUM CHLORIDE 1000 MILLILITER(S): 9 INJECTION INTRAMUSCULAR; INTRAVENOUS; SUBCUTANEOUS at 18:39

## 2024-02-05 RX ADMIN — PANTOPRAZOLE SODIUM 40 MILLIGRAM(S): 20 TABLET, DELAYED RELEASE ORAL at 18:36

## 2024-02-05 RX ADMIN — Medication 1 GRAM(S): at 18:36

## 2024-02-05 RX ADMIN — ONDANSETRON 4 MILLIGRAM(S): 8 TABLET, FILM COATED ORAL at 18:36

## 2024-02-05 NOTE — ED PROVIDER NOTE - NSFOLLOWUPCLINICS_GEN_ALL_ED_FT
[facility]  Gastroenterology  [address] [place] [address]  Phone: [phone]  Fax: [phone]

## 2024-02-05 NOTE — ED PROVIDER NOTE - CLINICAL SUMMARY MEDICAL DECISION MAKING FREE TEXT BOX
83 year old female with diarrhea, abd pain, nausea. PE as above.  labs, stool studies, fluids, reassess

## 2024-02-05 NOTE — ED ADULT NURSE NOTE - CADM POA CENTRAL LINE
Group Topic:  Education    Date: 3/31/2021  Start Time: 1630  End Time: 1707  Facilitators: Castillo Collier CNA    Focus:  Number in attendance:     Method: Group  Attendance: Present  Participation: Active  Patient Response: Appropriate feedback, Asked questions and Attentive  Mood: Normal  Affect: Type: Euthymic (normal mood)   Range: Full (normal)   Congruency: Congruent   Stability: Stable  Behavior/Socialization: Appropriate to group and Cooperative  Thought Process: Abstract  Task Performance: Follows directions  Patient Evaluation: Independent - full participation   No

## 2024-02-05 NOTE — ED PROVIDER NOTE - OBJECTIVE STATEMENT
83 year old female PMh CAD, HTN coming in with 3 days of abd discomfort, nausea, few episodes of watery brown diarrhea daily. states she was admitted to the hospital in december 2023 for b/l PNA and a UTI and was admitted and states after being discharged she was having episodes of diarrhea and abd pains. pt states that she then took omeprazole for 2 weeks and symptoms improved but now symptoms have returned. pt states only able to eat 83 year old female PMh CAD, HTN coming in with 3 days of abd discomfort, nausea, few episodes of watery brown diarrhea daily. states she was admitted to the hospital in december 2023 for b/l PNA and a UTI and was admitted and states after being discharged she was having episodes of diarrhea and abd pains. pt states that she then took omeprazole for 2 weeks and symptoms improved but now symptoms have returned. pt states only able to eat small amounts at time and then feels very nauseous. on the way to the ED couldn't hold in diarrhea and it came out by itself.

## 2024-02-05 NOTE — ED PROVIDER NOTE - NSFOLLOWUPINSTRUCTIONS_ED_ALL_ED_FT
Diarrhea, Adult  Diarrhea is frequent loose and sometimes watery bowel movements. Diarrhea can make you feel weak and cause you to become dehydrated. Dehydration is a condition in which there is not enough water or other fluids in the body. Dehydration can make you tired and thirsty, cause you to have a dry mouth, and decrease how often you urinate.    Diarrhea typically lasts 2–3 days. However, it can last longer if it is a sign of something more serious. It is important to treat your diarrhea as told by your health care provider.    Follow these instructions at home:  Eating and drinking    A bottle of clear fruit juice and glass of water.   Bread, rice, and cereal from the grain group.  Follow these recommendations as told by your health care provider:  Take an oral rehydration solution (ORS). This is an over-the-counter medicine that helps return your body to its normal balance of nutrients and water. It is found at pharmacies and retail stores.  Drink enough fluid to keep your urine pale yellow.  Drink fluids such as water, diluted fruit juice, and low-calorie sports drinks. You can drink milk also, if desired. Sucking on ice chips is another way to get fluids.  Avoid drinking fluids that contain a lot of sugar or caffeine, such as soda, energy drinks, and regular sports drinks.  Avoid alcohol.  Eat bland, easy-to-digest foods in small amounts as you are able. These foods include bananas, applesauce, rice, lean meats, toast, and crackers.  Avoid spicy or fatty foods.  Medicines    Take over-the-counter and prescription medicines only as told by your health care provider.  If you were prescribed antibiotics, take them as told by your health care provider. Do not stop using the antibiotic even if you start to feel better.  General instructions    Washing hands with soap and water.  Wash your hands often using soap and water for at least 20 seconds. If soap and water are not available, use hand . Others in the household should wash their hands as well. Hands should be washed:  After using the toilet or changing a diaper.  Before preparing, cooking, or serving food.  While caring for a sick person or while visiting someone in a hospital.  Rest at home while you recover.  Take a warm bath to relieve any burning or pain from frequent diarrhea episodes.  Watch your condition for any changes.  Contact a health care provider if:  You have a fever.  Your diarrhea gets worse.  You have new symptoms.  You vomit every time you eat or drink.  You feel light-headed, dizzy, or have a headache.  You have muscle cramps.  You have signs of dehydration, such as:  Dark urine, very little urine, or no urine.  Cracked lips.  Dry mouth.  Sunken eyes.  Sleepiness.  Weakness.  You have bloody or black stools or stools that look like tar.  You have severe pain, cramping, or bloating in your abdomen.  Your skin feels cold and clammy.  You feel confused.  Get help right away if:  You have chest pain or your heart is beating very quickly.  You have trouble breathing or you are breathing very quickly.  You feel extremely weak or you faint.  These symptoms may be an emergency. Get help right away. Call 911.  Do not wait to see if the symptoms will go away.  Do not drive yourself to the hospital.  This information is not intended to replace advice given to you by your health care provider. Make sure you discuss any questions you have with your health care provider.    Document Revised: 06/06/2023 Document Reviewed: 06/06/2023  [employer] Patient Education © 2023 [employer]  [employer] logo  Terms and Conditions  Privacy Policy  Editorial Policy  All content on this site: Copyright © 2024 Elsevier, its licensors, and contributors. All rights are reserved, including those for text and data mining, AI training, and similar technologies. For all open access content, the Creative Commons licensing terms apply.  Cookies are used by this site. To decline or learn more, visit our Cookies page.

## 2024-02-05 NOTE — ED ADULT NURSE NOTE - NSFALLHARMRISKINTERV_ED_ALL_ED

## 2024-02-05 NOTE — ED PROVIDER NOTE - PROGRESS NOTE DETAILS
pt feels improved. tolerating PO in the ed without issue. labs are unremarkable. unable to provide stool sample- advised can also give a sample to PCP for culture. f/u with GI. rx for GI meds. return precautions discussed.

## 2024-02-05 NOTE — ED PROVIDER NOTE - PATIENT PORTAL LINK FT
You can access the FollowMyHealth Patient Portal offered by [facility] by registering at the following website: [email or web address]. By joining [facility]’s FollowMyHealth portal, you will also be able to view your health information using other applications (apps) compatible with our system.

## 2024-02-06 PROBLEM — I10 ESSENTIAL (PRIMARY) HYPERTENSION: Chronic | Status: ACTIVE | Noted: 2023-12-10

## 2024-02-06 PROBLEM — I25.10 ATHEROSCLEROTIC HEART DISEASE OF NATIVE CORONARY ARTERY WITHOUT ANGINA PECTORIS: Chronic | Status: ACTIVE | Noted: 2023-12-10

## 2025-01-14 NOTE — PATIENT PROFILE ADULT - PATIENT/CAREGIVER OFFERED  INTERPRETER SERVICES
"Daily Note     Today's date: 2025  Patient name: Amari Jenkins  : 1972  MRN: [id number]  Referring provider: Belen Tatum P*  Dx:   Encounter Diagnosis     ICD-10-CM    1. Tear of right supraspinatus tendon  M75.101       2. Status post rotator cuff repair  Z98.890           Start Time: 930  Stop Time: 1015  Total time in clinic (min): 45 minutes    Subjective:   My shoulder is doing ok today.        Objective: See treatment diary below      Assessment: Tolerated treatment well. Patient would benefit from continued PT we increased the UBE today to 2/2 with good tolerance.,  He was able to use 2# on his right wrist for cones x three times.  We continue to work on his strength as per protocol. He had good passive motion today with some mild tightness with ER and IR.       Plan: Continue per plan of care.      Precautions: RC repair DOS 24      DATE 12/31 1/3/25 1/7/25 1/9/25 1/14   FOTO   JF  41      MANUALS        Shoulder PROM per protocol  8min  JF 10' JF 10 min  JF           Neuro Re-Ed        Scap retraction   20 x  20x  20x    Bent over flexion and row 30x ea.  4#  30 x 20 x  4#   5# 30 x row bend over 5#  30 x                           Ther Ex        UBE   L 1     L 1     L 2  2/2   Gr band wall slide      20 x    BELINDA, row, Triceps extension P2 3x10 P 1  20 x  row / BELINDA  Tri P 2   20 x P 3   20 x  BELINDA  Tri  P 2  20 x P 3  20 x  belinda  Tri  P 2   20 x P3 30x ea row/ BELINDA  Tri P 3 30 x   Standing abd  20 x 2# 2#  20 x 2#  20 x 2#  20 x   scaption 20 x 2# 2#  20 x 2# 20 x 2#  20 x   Supine abd/ circles 3# 20x ea.  20 x 2#  20 x 2#  20 x 3#  2# 20x ea`   Elbow Flex/Ext 4#  30 x 4#   30 x Standing 3# 5# 30x 5# 30x   Prone S ext 20 x  30 x 2# 20 x    Wand  flex 3# 30x ea.  30 X 3# 30 x 3#  30 x  4#  20x  ea   Wall slide 20x 20 x Wall slide flexion 20 x  Wall slide 20 x 10x    Objective updates        ER/ IR  OTB   15x OTB   15 x 10x5\" OTB   20 x  15x 3\"   Nu-step  no arms 8m L5 8m L5 " 8m L5  NV ube   Ther Activity        Cones 4x  4 x  4 x  5x 3x OH  2#            Gait Training                Modalities        CP R Shld NT 10' 10' TS 10 min defer                                             yes

## 2025-03-17 RX ORDER — AMOXICILLIN 500 MG/1
2 CAPSULE ORAL
Refills: 0 | DISCHARGE
Start: 2025-03-17

## 2025-03-22 ENCOUNTER — INPATIENT (INPATIENT)
Facility: HOSPITAL | Age: 85
LOS: 3 days | Discharge: HOME | DRG: 193 | End: 2025-03-26
Attending: STUDENT IN AN ORGANIZED HEALTH CARE EDUCATION/TRAINING PROGRAM | Admitting: STUDENT IN AN ORGANIZED HEALTH CARE EDUCATION/TRAINING PROGRAM
Payer: MEDICAID

## 2025-03-22 VITALS
OXYGEN SATURATION: 94 % | HEART RATE: 84 BPM | WEIGHT: 147.71 LBS | DIASTOLIC BLOOD PRESSURE: 83 MMHG | RESPIRATION RATE: 18 BRPM | HEIGHT: 63 IN | TEMPERATURE: 98 F | SYSTOLIC BLOOD PRESSURE: 123 MMHG

## 2025-03-22 LAB
ALBUMIN SERPL ELPH-MCNC: 2.9 G/DL — LOW (ref 3.5–5)
ALP SERPL-CCNC: 114 U/L — SIGNIFICANT CHANGE UP (ref 40–120)
ALT FLD-CCNC: 21 U/L DA — SIGNIFICANT CHANGE UP (ref 10–60)
ANION GAP SERPL CALC-SCNC: 4 MMOL/L — LOW (ref 5–17)
AST SERPL-CCNC: 19 U/L — SIGNIFICANT CHANGE UP (ref 10–40)
BASOPHILS # BLD AUTO: 0.06 K/UL — SIGNIFICANT CHANGE UP (ref 0–0.2)
BASOPHILS NFR BLD AUTO: 0.7 % — SIGNIFICANT CHANGE UP (ref 0–2)
BILIRUB SERPL-MCNC: 0.2 MG/DL — SIGNIFICANT CHANGE UP (ref 0.2–1.2)
BUN SERPL-MCNC: 19 MG/DL — HIGH (ref 7–18)
CALCIUM SERPL-MCNC: 9.4 MG/DL — SIGNIFICANT CHANGE UP (ref 8.4–10.5)
CHLORIDE SERPL-SCNC: 106 MMOL/L — SIGNIFICANT CHANGE UP (ref 96–108)
CO2 SERPL-SCNC: 27 MMOL/L — SIGNIFICANT CHANGE UP (ref 22–31)
CREAT SERPL-MCNC: 0.91 MG/DL — SIGNIFICANT CHANGE UP (ref 0.5–1.3)
EGFR: 61 ML/MIN/1.73M2 — SIGNIFICANT CHANGE UP
EGFR: 61 ML/MIN/1.73M2 — SIGNIFICANT CHANGE UP
EOSINOPHIL # BLD AUTO: 0.42 K/UL — SIGNIFICANT CHANGE UP (ref 0–0.5)
EOSINOPHIL NFR BLD AUTO: 4.7 % — SIGNIFICANT CHANGE UP (ref 0–6)
FLUAV AG NPH QL: SIGNIFICANT CHANGE UP
FLUBV AG NPH QL: SIGNIFICANT CHANGE UP
GLUCOSE SERPL-MCNC: 100 MG/DL — HIGH (ref 70–99)
HCT VFR BLD CALC: 34 % — LOW (ref 34.5–45)
HGB BLD-MCNC: 11 G/DL — LOW (ref 11.5–15.5)
IMM GRANULOCYTES NFR BLD AUTO: 0.3 % — SIGNIFICANT CHANGE UP (ref 0–0.9)
LACTATE SERPL-SCNC: 1 MMOL/L — SIGNIFICANT CHANGE UP (ref 0.7–2)
LYMPHOCYTES # BLD AUTO: 2.26 K/UL — SIGNIFICANT CHANGE UP (ref 1–3.3)
LYMPHOCYTES # BLD AUTO: 25.3 % — SIGNIFICANT CHANGE UP (ref 13–44)
MAGNESIUM SERPL-MCNC: 1.8 MG/DL — SIGNIFICANT CHANGE UP (ref 1.6–2.6)
MCHC RBC-ENTMCNC: 29.1 PG — SIGNIFICANT CHANGE UP (ref 27–34)
MCHC RBC-ENTMCNC: 32.4 G/DL — SIGNIFICANT CHANGE UP (ref 32–36)
MCV RBC AUTO: 89.9 FL — SIGNIFICANT CHANGE UP (ref 80–100)
MONOCYTES # BLD AUTO: 0.75 K/UL — SIGNIFICANT CHANGE UP (ref 0–0.9)
MONOCYTES NFR BLD AUTO: 8.4 % — SIGNIFICANT CHANGE UP (ref 2–14)
NEUTROPHILS # BLD AUTO: 5.4 K/UL — SIGNIFICANT CHANGE UP (ref 1.8–7.4)
NEUTROPHILS NFR BLD AUTO: 60.6 % — SIGNIFICANT CHANGE UP (ref 43–77)
NRBC BLD AUTO-RTO: 0 /100 WBCS — SIGNIFICANT CHANGE UP (ref 0–0)
NT-PROBNP SERPL-SCNC: 323 PG/ML — SIGNIFICANT CHANGE UP (ref 0–450)
PLATELET # BLD AUTO: 357 K/UL — SIGNIFICANT CHANGE UP (ref 150–400)
POTASSIUM SERPL-MCNC: 3.9 MMOL/L — SIGNIFICANT CHANGE UP (ref 3.5–5.3)
POTASSIUM SERPL-SCNC: 3.9 MMOL/L — SIGNIFICANT CHANGE UP (ref 3.5–5.3)
PROT SERPL-MCNC: 7.6 G/DL — SIGNIFICANT CHANGE UP (ref 6–8.3)
RBC # BLD: 3.78 M/UL — LOW (ref 3.8–5.2)
RBC # FLD: 13.4 % — SIGNIFICANT CHANGE UP (ref 10.3–14.5)
RSV RNA NPH QL NAA+NON-PROBE: SIGNIFICANT CHANGE UP
SARS-COV-2 RNA SPEC QL NAA+PROBE: SIGNIFICANT CHANGE UP
SODIUM SERPL-SCNC: 137 MMOL/L — SIGNIFICANT CHANGE UP (ref 135–145)
SOURCE RESPIRATORY: SIGNIFICANT CHANGE UP
TROPONIN I, HIGH SENSITIVITY RESULT: 22 NG/L — SIGNIFICANT CHANGE UP
WBC # BLD: 8.92 K/UL — SIGNIFICANT CHANGE UP (ref 3.8–10.5)
WBC # FLD AUTO: 8.92 K/UL — SIGNIFICANT CHANGE UP (ref 3.8–10.5)

## 2025-03-22 PROCEDURE — 71250 CT THORAX DX C-: CPT | Mod: 26

## 2025-03-22 PROCEDURE — 99285 EMERGENCY DEPT VISIT HI MDM: CPT

## 2025-03-22 RX ORDER — CEFTRIAXONE 500 MG/1
1000 INJECTION, POWDER, FOR SOLUTION INTRAMUSCULAR; INTRAVENOUS ONCE
Refills: 0 | Status: COMPLETED | OUTPATIENT
Start: 2025-03-22 | End: 2025-03-22

## 2025-03-22 RX ADMIN — CEFTRIAXONE 100 MILLIGRAM(S): 500 INJECTION, POWDER, FOR SOLUTION INTRAMUSCULAR; INTRAVENOUS at 23:08

## 2025-03-22 NOTE — ED ADULT TRIAGE NOTE - STATUS:
Patient brought in via EMS from home after falling while \"putting up the spoons in the [employer] cabinet\".  Laceration to right hand, wrapped and bleeding controlled by EMS
Applied

## 2025-03-22 NOTE — ED ADULT NURSE NOTE - NSFALLHARMRISKINTERV_ED_ALL_ED
Assistance OOB with selected safe patient handling equipment if applicable/Communicate risk of Fall with Harm to all staff, patient, and family/Provide visual cue: red socks, yellow wristband, yellow gown, etc/Reinforce activity limits and safety measures with patient and family/Bed in lowest position, wheels locked, appropriate side rails in place/Call bell, personal items and telephone in reach/Instruct patient to call for assistance before getting out of bed/chair/stretcher/Non-slip footwear applied when patient is off stretcher/[place] to call system/Physically safe environment - no spills, clutter or unnecessary equipment/Purposeful Proactive Rounding/Room/bathroom lighting operational, light cord in reach

## 2025-03-22 NOTE — ED ADULT TRIAGE NOTE - CHIEF COMPLAINT QUOTE
Pt reports that she has  cough , SOB , weakness and feels cold  of arms  and legs ,  for last 7 days . was treated by CHRISTIAN MERA . Pt took grand sons  amoxicillin by herself  for 5 days .

## 2025-03-23 DIAGNOSIS — J18.9 PNEUMONIA, UNSPECIFIED ORGANISM: ICD-10-CM

## 2025-03-23 DIAGNOSIS — J96.01 ACUTE RESPIRATORY FAILURE WITH HYPOXIA: ICD-10-CM

## 2025-03-23 DIAGNOSIS — Z29.9 ENCOUNTER FOR PROPHYLACTIC MEASURES, UNSPECIFIED: ICD-10-CM

## 2025-03-23 DIAGNOSIS — E78.5 HYPERLIPIDEMIA, UNSPECIFIED: ICD-10-CM

## 2025-03-23 DIAGNOSIS — I10 ESSENTIAL (PRIMARY) HYPERTENSION: ICD-10-CM

## 2025-03-23 DIAGNOSIS — I25.10 ATHEROSCLEROTIC HEART DISEASE OF NATIVE CORONARY ARTERY WITHOUT ANGINA PECTORIS: ICD-10-CM

## 2025-03-23 DIAGNOSIS — J47.0 BRONCHIECTASIS WITH ACUTE LOWER RESPIRATORY INFECTION: ICD-10-CM

## 2025-03-23 LAB
ALBUMIN SERPL ELPH-MCNC: 2.9 G/DL — LOW (ref 3.5–5)
ALP SERPL-CCNC: 100 U/L — SIGNIFICANT CHANGE UP (ref 40–120)
ALT FLD-CCNC: 18 U/L DA — SIGNIFICANT CHANGE UP (ref 10–60)
ANION GAP SERPL CALC-SCNC: 6 MMOL/L — SIGNIFICANT CHANGE UP (ref 5–17)
AST SERPL-CCNC: 16 U/L — SIGNIFICANT CHANGE UP (ref 10–40)
BASOPHILS # BLD AUTO: 0.07 K/UL — SIGNIFICANT CHANGE UP (ref 0–0.2)
BASOPHILS NFR BLD AUTO: 0.9 % — SIGNIFICANT CHANGE UP (ref 0–2)
BILIRUB SERPL-MCNC: 0.4 MG/DL — SIGNIFICANT CHANGE UP (ref 0.2–1.2)
BUN SERPL-MCNC: 14 MG/DL — SIGNIFICANT CHANGE UP (ref 7–18)
CALCIUM SERPL-MCNC: 9.5 MG/DL — SIGNIFICANT CHANGE UP (ref 8.4–10.5)
CHLORIDE SERPL-SCNC: 105 MMOL/L — SIGNIFICANT CHANGE UP (ref 96–108)
CO2 SERPL-SCNC: 26 MMOL/L — SIGNIFICANT CHANGE UP (ref 22–31)
CREAT SERPL-MCNC: 0.82 MG/DL — SIGNIFICANT CHANGE UP (ref 0.5–1.3)
EGFR: 70 ML/MIN/1.73M2 — SIGNIFICANT CHANGE UP
EGFR: 70 ML/MIN/1.73M2 — SIGNIFICANT CHANGE UP
EOSINOPHIL # BLD AUTO: 0.3 K/UL — SIGNIFICANT CHANGE UP (ref 0–0.5)
EOSINOPHIL NFR BLD AUTO: 4 % — SIGNIFICANT CHANGE UP (ref 0–6)
GLUCOSE SERPL-MCNC: 101 MG/DL — HIGH (ref 70–99)
HCT VFR BLD CALC: 33.3 % — LOW (ref 34.5–45)
HGB BLD-MCNC: 11.2 G/DL — LOW (ref 11.5–15.5)
IMM GRANULOCYTES NFR BLD AUTO: 0.3 % — SIGNIFICANT CHANGE UP (ref 0–0.9)
LYMPHOCYTES # BLD AUTO: 1.91 K/UL — SIGNIFICANT CHANGE UP (ref 1–3.3)
LYMPHOCYTES # BLD AUTO: 25.7 % — SIGNIFICANT CHANGE UP (ref 13–44)
MAGNESIUM SERPL-MCNC: 2 MG/DL — SIGNIFICANT CHANGE UP (ref 1.6–2.6)
MCHC RBC-ENTMCNC: 30 PG — SIGNIFICANT CHANGE UP (ref 27–34)
MCHC RBC-ENTMCNC: 33.6 G/DL — SIGNIFICANT CHANGE UP (ref 32–36)
MCV RBC AUTO: 89.3 FL — SIGNIFICANT CHANGE UP (ref 80–100)
MONOCYTES # BLD AUTO: 0.73 K/UL — SIGNIFICANT CHANGE UP (ref 0–0.9)
MONOCYTES NFR BLD AUTO: 9.8 % — SIGNIFICANT CHANGE UP (ref 2–14)
MRSA PCR RESULT.: SIGNIFICANT CHANGE UP
NEUTROPHILS # BLD AUTO: 4.41 K/UL — SIGNIFICANT CHANGE UP (ref 1.8–7.4)
NEUTROPHILS NFR BLD AUTO: 59.3 % — SIGNIFICANT CHANGE UP (ref 43–77)
NRBC BLD AUTO-RTO: 0 /100 WBCS — SIGNIFICANT CHANGE UP (ref 0–0)
PHOSPHATE SERPL-MCNC: 3 MG/DL — SIGNIFICANT CHANGE UP (ref 2.5–4.5)
PLATELET # BLD AUTO: 354 K/UL — SIGNIFICANT CHANGE UP (ref 150–400)
POTASSIUM SERPL-MCNC: 3.3 MMOL/L — LOW (ref 3.5–5.3)
POTASSIUM SERPL-SCNC: 3.3 MMOL/L — LOW (ref 3.5–5.3)
PROT SERPL-MCNC: 7.5 G/DL — SIGNIFICANT CHANGE UP (ref 6–8.3)
RBC # BLD: 3.73 M/UL — LOW (ref 3.8–5.2)
RBC # FLD: 13.2 % — SIGNIFICANT CHANGE UP (ref 10.3–14.5)
S AUREUS DNA NOSE QL NAA+PROBE: SIGNIFICANT CHANGE UP
SODIUM SERPL-SCNC: 137 MMOL/L — SIGNIFICANT CHANGE UP (ref 135–145)
WBC # BLD: 7.44 K/UL — SIGNIFICANT CHANGE UP (ref 3.8–10.5)
WBC # FLD AUTO: 7.44 K/UL — SIGNIFICANT CHANGE UP (ref 3.8–10.5)

## 2025-03-23 PROCEDURE — 99223 1ST HOSP IP/OBS HIGH 75: CPT | Mod: GC

## 2025-03-23 RX ORDER — ONDANSETRON HCL/PF 4 MG/2 ML
4 VIAL (ML) INJECTION EVERY 8 HOURS
Refills: 0 | Status: DISCONTINUED | OUTPATIENT
Start: 2025-03-23 | End: 2025-03-26

## 2025-03-23 RX ORDER — IPRATROPIUM BROMIDE AND ALBUTEROL SULFATE .5; 2.5 MG/3ML; MG/3ML
3 SOLUTION RESPIRATORY (INHALATION) EVERY 6 HOURS
Refills: 0 | Status: DISCONTINUED | OUTPATIENT
Start: 2025-03-23 | End: 2025-03-26

## 2025-03-23 RX ORDER — INFLUENZA A VIRUS A/IDAHO/07/2018 (H1N1) ANTIGEN (MDCK CELL DERIVED, PROPIOLACTONE INACTIVATED, INFLUENZA A VIRUS A/INDIANA/08/2018 (H3N2) ANTIGEN (MDCK CELL DERIVED, PROPIOLACTONE INACTIVATED), INFLUENZA B VIRUS B/SINGAPORE/INFTT-16-0610/2016 ANTIGEN (MDCK CELL DERIVED, PROPIOLACTONE INACTIVATED), INFLUENZA B VIRUS B/IOWA/06/2017 ANTIGEN (MDCK CELL DERIVED, PROPIOLACTONE INACTIVATED) 15; 15; 15; 15 UG/.5ML; UG/.5ML; UG/.5ML; UG/.5ML
0.5 INJECTION, SUSPENSION INTRAMUSCULAR ONCE
Refills: 0 | Status: COMPLETED | OUTPATIENT
Start: 2025-03-23 | End: 2025-03-23

## 2025-03-23 RX ORDER — ROSUVASTATIN CALCIUM 5 MG/1
1 TABLET, FILM COATED ORAL
Refills: 0 | DISCHARGE

## 2025-03-23 RX ORDER — MECLIZINE HCL 12.5 MG
1 TABLET ORAL
Refills: 0 | DISCHARGE

## 2025-03-23 RX ORDER — ALBUTEROL SULFATE 2.5 MG/3ML
2 VIAL, NEBULIZER (ML) INHALATION EVERY 6 HOURS
Refills: 0 | Status: DISCONTINUED | OUTPATIENT
Start: 2025-03-23 | End: 2025-03-24

## 2025-03-23 RX ORDER — DEXTROMETHORPHAN HBR, GUAIFENESIN 200 MG/10ML
1200 LIQUID ORAL EVERY 12 HOURS
Refills: 0 | Status: DISCONTINUED | OUTPATIENT
Start: 2025-03-23 | End: 2025-03-26

## 2025-03-23 RX ORDER — ASPIRIN 325 MG
81 TABLET ORAL DAILY
Refills: 0 | Status: DISCONTINUED | OUTPATIENT
Start: 2025-03-23 | End: 2025-03-26

## 2025-03-23 RX ORDER — B1/B2/B3/B5/B6/B12/VIT C/FOLIC 500-0.5 MG
1 TABLET ORAL DAILY
Refills: 0 | Status: DISCONTINUED | OUTPATIENT
Start: 2025-03-23 | End: 2025-03-23

## 2025-03-23 RX ORDER — CEFEPIME 2 G/20ML
2000 INJECTION, POWDER, FOR SOLUTION INTRAVENOUS EVERY 12 HOURS
Refills: 0 | Status: DISCONTINUED | OUTPATIENT
Start: 2025-03-23 | End: 2025-03-26

## 2025-03-23 RX ORDER — ENOXAPARIN SODIUM 100 MG/ML
40 INJECTION SUBCUTANEOUS EVERY 24 HOURS
Refills: 0 | Status: DISCONTINUED | OUTPATIENT
Start: 2025-03-23 | End: 2025-03-26

## 2025-03-23 RX ORDER — OMEPRAZOLE 20 MG/1
1 CAPSULE, DELAYED RELEASE ORAL
Refills: 0 | DISCHARGE

## 2025-03-23 RX ORDER — ROSUVASTATIN CALCIUM 5 MG/1
40 TABLET, FILM COATED ORAL AT BEDTIME
Refills: 0 | Status: DISCONTINUED | OUTPATIENT
Start: 2025-03-23 | End: 2025-03-26

## 2025-03-23 RX ORDER — AZITHROMYCIN 250 MG
500 CAPSULE ORAL ONCE
Refills: 0 | Status: COMPLETED | OUTPATIENT
Start: 2025-03-23 | End: 2025-03-23

## 2025-03-23 RX ORDER — B1/B2/B3/B5/B6/B12/VIT C/FOLIC 500-0.5 MG
1 TABLET ORAL DAILY
Refills: 0 | Status: DISCONTINUED | OUTPATIENT
Start: 2025-03-23 | End: 2025-03-26

## 2025-03-23 RX ORDER — ACETAMINOPHEN 500 MG/5ML
650 LIQUID (ML) ORAL EVERY 6 HOURS
Refills: 0 | Status: DISCONTINUED | OUTPATIENT
Start: 2025-03-23 | End: 2025-03-26

## 2025-03-23 RX ORDER — ASPIRIN 325 MG
1 TABLET ORAL
Refills: 0 | DISCHARGE

## 2025-03-23 RX ORDER — AZITHROMYCIN 250 MG
500 CAPSULE ORAL EVERY 24 HOURS
Refills: 0 | Status: COMPLETED | OUTPATIENT
Start: 2025-03-24 | End: 2025-03-25

## 2025-03-23 RX ADMIN — ENOXAPARIN SODIUM 40 MILLIGRAM(S): 100 INJECTION SUBCUTANEOUS at 07:18

## 2025-03-23 RX ADMIN — CEFEPIME 100 MILLIGRAM(S): 2 INJECTION, POWDER, FOR SOLUTION INTRAVENOUS at 06:22

## 2025-03-23 RX ADMIN — DEXTROMETHORPHAN HBR, GUAIFENESIN 1200 MILLIGRAM(S): 200 LIQUID ORAL at 06:22

## 2025-03-23 RX ADMIN — Medication 250 MILLIGRAM(S): at 00:34

## 2025-03-23 RX ADMIN — CEFEPIME 100 MILLIGRAM(S): 2 INJECTION, POWDER, FOR SOLUTION INTRAVENOUS at 18:09

## 2025-03-23 RX ADMIN — IPRATROPIUM BROMIDE AND ALBUTEROL SULFATE 3 MILLILITER(S): .5; 2.5 SOLUTION RESPIRATORY (INHALATION) at 08:54

## 2025-03-23 RX ADMIN — ROSUVASTATIN CALCIUM 40 MILLIGRAM(S): 5 TABLET, FILM COATED ORAL at 21:34

## 2025-03-23 RX ADMIN — IPRATROPIUM BROMIDE AND ALBUTEROL SULFATE 3 MILLILITER(S): .5; 2.5 SOLUTION RESPIRATORY (INHALATION) at 03:12

## 2025-03-23 RX ADMIN — IPRATROPIUM BROMIDE AND ALBUTEROL SULFATE 3 MILLILITER(S): .5; 2.5 SOLUTION RESPIRATORY (INHALATION) at 20:06

## 2025-03-23 RX ADMIN — Medication 40 MILLIEQUIVALENT(S): at 09:39

## 2025-03-23 RX ADMIN — IPRATROPIUM BROMIDE AND ALBUTEROL SULFATE 3 MILLILITER(S): .5; 2.5 SOLUTION RESPIRATORY (INHALATION) at 14:37

## 2025-03-23 RX ADMIN — Medication 40 MILLIGRAM(S): at 06:22

## 2025-03-23 RX ADMIN — DEXTROMETHORPHAN HBR, GUAIFENESIN 1200 MILLIGRAM(S): 200 LIQUID ORAL at 18:09

## 2025-03-23 RX ADMIN — Medication 81 MILLIGRAM(S): at 12:22

## 2025-03-23 NOTE — H&P ADULT - NSHPSOCIALHISTORY_GEN_ALL_CORE
originally from [place] the [place] for last year, retired former , denies smoking, denies etoh, denies illicit drug use

## 2025-03-23 NOTE — PATIENT PROFILE ADULT - FALL HARM RISK - HARM RISK INTERVENTIONS
Assistance with ambulation/Assistance OOB with selected safe patient handling equipment/Communicate Risk of Fall with Harm to all staff/Monitor gait and stability/Reinforce activity limits and safety measures with patient and family/Sit up slowly, dangle for a short time, stand at bedside before walking/Tailored Fall Risk Interventions/Visual Cue: Yellow wristband and red socks/Bed in lowest position, wheels locked, appropriate side rails in place/Call bell, personal items and telephone in reach/Instruct patient to call for assistance before getting out of bed or chair/Non-slip footwear when patient is out of bed/[place] to call system/Physically safe environment - no spills, clutter or unnecessary equipment/Purposeful Proactive Rounding/Room/bathroom lighting operational, light cord in reach

## 2025-03-23 NOTE — ED PROVIDER NOTE - OBJECTIVE STATEMENT
D4-year-old female with a past medical history of CAD, hypertension presents with a 1 week history of cough occasionally productive with shortness of breath.  Patient started taking amoxicillin prescription that her grandson had but states it was not helping so initially went to urgent care where she was found to be hypoxic and was sent to the ED for evaluation.  Patient states she was diagnosed with multifocal pneumonia couple of years ago as well.

## 2025-03-23 NOTE — H&P ADULT - PROBLEM SELECTOR PLAN 3
h/o HTN on  -c/w home meds   -monitor BP  -adjust antihypertensive meds as appropriate hx of CAD w/ reported MI in 2017  -c/w home aspirin and statin

## 2025-03-23 NOTE — H&P ADULT - NSHPPHYSICALEXAM_GEN_ALL_CORE
LOS:     VITALS:   T(C): 36.9 (03-23-25 @ 00:43), Max: 36.9 (03-23-25 @ 00:43)  HR: 83 (03-23-25 @ 00:43) (83 - 84)  BP: 126/76 (03-23-25 @ 00:43) (123/83 - 126/76)  RR: 21 (03-23-25 @ 00:43) (18 - 21)  SpO2: 94% (03-23-25 @ 00:43) (94% - 94%)    GENERAL: NAD, lying in bed comfortably  HEAD:  Atraumatic, Normocephalic  EYES: EOMI, PERRLA, conjunctiva and sclera clear  ENT: Moist mucous membranes  NECK: Supple, No JVD  CHEST/LUNG: Clear to auscultation bilaterally; No rales, rhonchi, wheezing, or rubs. Unlabored respirations  HEART: Regular rate and rhythm; No murmurs, rubs, or gallops  ABDOMEN: BSx4; Soft, nontender, nondistended  EXTREMITIES:  2+ Peripheral Pulses, brisk capillary refill. No clubbing, cyanosis, or edema  NERVOUS SYSTEM:  A&Ox3, no focal deficits   SKIN: No rashes or lesions LOS:     VITALS:   T(C): 36.9 (03-23-25 @ 00:43), Max: 36.9 (03-23-25 @ 00:43)  HR: 83 (03-23-25 @ 00:43) (83 - 84)  BP: 126/76 (03-23-25 @ 00:43) (123/83 - 126/76)  RR: 21 (03-23-25 @ 00:43) (18 - 21)  SpO2: 94% (03-23-25 @ 00:43) (94% - 94%)    GENERAL: NAD, lying in bed comfortably  HEAD:  Atraumatic, Normocephalic  EYES: EOMI, PERRLA, conjunctiva and sclera clear  ENT: Moist mucous membranes  NECK: Supple, No JVD  CHEST/LUNG: Prominent bibasilar rales; No rhonchi, wheezing, or rubs. Unlabored respirations  HEART: Regular rate and rhythm; +systolic murmur, no rubs or gallops  ABDOMEN: BSx4; Soft, nontender, nondistended  EXTREMITIES:  2+ Peripheral Pulses, brisk capillary refill. No clubbing, cyanosis, or edema  NERVOUS SYSTEM:  A&Ox3, no focal deficits   SKIN: No rashes or lesions

## 2025-03-23 NOTE — ED PROVIDER NOTE - PROGRESS NOTE DETAILS
Labs are grossly unremarkable.  Flu/COVID/RSV is negative.  CT chest with multifocal pneumonia.  Patient's oxygen saturation on room air ranging from 87 to 94%.  Will admit.

## 2025-03-23 NOTE — H&P ADULT - PROBLEM SELECTOR PLAN 1
p/w fatigue, shortness of breath and productive cough with home spO2 reading 84% prior to admission  -spO2 as low as 88% on admission improved to 93-95% on 2L NC  -CT Chest shows multiple scattered bilateral centrilobular nodules, tree-in-bud, groundglass opacities and small consolidations, concerning for multifocal infection including particular consideration to nontuberculous   mycobacterial infection.  -Flu/Covid/RSV PCR negative  -s/p ceftriaxone/azithromycin in the ED  -c/w azithromycin 500mg qd   -start cefepime 2g q12 for pseudomonal coverage in s/o bronchiectasis  -start duonebs q6 ATC, mucinex 1200mg q12 for airway clearance   -albuterol HFA q6 PRN  -f/u strep/legionella urine ag,  mycoplasma IgM  -f/u sputum culture with AFB smear/culture  -c/w supplemental O2 PRN--wean as tolerated  -incentive spirometry, OOBTC  -supportive care  -Pulm consult in AM

## 2025-03-23 NOTE — H&P ADULT - HISTORY OF PRESENT ILLNESS
84y F with PMH of CAD, HTN presenting with 6 days of fatigue, cough, and shortness of breath.   .       In the ED:  T(F): , Max: 98.5 (00:43); HR:  (83 - 84); BP:  (123/83 - 126/76); RR:  (18 - 21); SpO2:  (94% - 94%)  WBC:8.92, Hb.0, PLT:357  Na:137, K:3.9, Cl:106, HCO3:27, BUN:19, sCr:0.91, Lactate: 1.0  AST:19, ALT:21, ALP:114, Tbili:0.2, Lipase:--   Troponin:22.0, p-BNP:323  s/p azithromycin  IVPB 500 milliGRAM(s); cefTRIAXone   IVPB 1000 milliGRAM(s) 84y F with PMH of GERD, CAD, previous MI 2017, and HTN presenting with 6 days of fatigue, cough, and shortness of breath. Patient accompanied by granddaughter providing collateral and Prydeinig translation at bedside. States that around 6-7 days ago patient started to feel "under the weather", endorsing fatigue, shortness of breath and cough productive of white frothy sputum. Patient started taking old Rx of Amoxicillin 1000mg BID 6 days prior to admission without improvement of symptoms. Reports that family checked pulse ox daily and today saw that spO2 had dropped to 84-85% prompting a visit to urgent care before being sent to ED. Patient was admitted to [facility] in Dec. 2023 for multifocal PNA presenting with similar complaints.  Patient reports following with cardiologist and says pending a procedure to assess heart valve with patient description of procedure likely ORLY. Endorses 2 pillow orthopnea and dyspnea with brisk walking at baseline. Currently complaining of diffuse anterior chest pain when coughing, shortness of breath, productive cough, fatigue, and headache. Reports one episode of nausea and vomiting after drinking cough syrup yesterday. Denies fever, chills, dizziness, palpitations, abdominal pain, nausea, diarrhea, constipation, and dysuria. Denies known exposure to TB and other environmental respiratory exposures including second hand smoke,       In the ED:  T(F): , Max: 98.5 (00:43); HR:  (83 - 84); BP:  (123/83 - 126/76); RR:  (18 - 21); SpO2:  (94% - 94%)  WBC:8.92, Hb.0, PLT:357  Na:137, K:3.9, Cl:106, HCO3:27, BUN:19, sCr:0.91, Lactate: 1.0  AST:19, ALT:21, ALP:114, Tbili:0.2  Troponin:22.0, p-BNP:323  s/p azithromycin  IVPB 500 milliGRAM(s); cefTRIAXone   IVPB 1000 milliGRAM(s)

## 2025-03-23 NOTE — H&P ADULT - ASSESSMENT
84y F with PMH of CAD, GERD, HTN presenting with 6 days of fatigue, cough, and shortness of breath. Admitted to medicine for acute hypoxic respiratory failure 2/2 multifocal pneumonia and bronchiectasis.  84y F with PMH of GERD, CAD, previous MI 2017, and HTN presenting with 6 days of fatigue, cough, and shortness of breath Admitted to medicine for acute hypoxic respiratory failure 2/2 multifocal pneumonia and bronchiectasis.

## 2025-03-23 NOTE — H&P ADULT - ATTENDING COMMENTS
IMAGING  EKG  Normal Sinus Rhythm, 79 bpm, QTc 428ms, VT 180ms, on my read no ST segment elevation or depression, TWI in III and V1. When compared to prior EKG from 12/10/2023, TWIs are chronic.    CT Chest  LUNGS AND LARGE AIRWAYS: Patent central airways. Mild diffuse bronchiectasis, especially pronounced in the lingula and lower lobes. Extensive patchy bilateral centrilobular tree-in-bud nodules, small consolidations, groundglass opacities, including wedge-shaped consolidation with surrounding groundglass in the anterior segment of the left upper lobe abutting the mediastinum, concerning for multifocal infection. Few scattered tiny calcified granulomata bilaterally.  IMPRESSION:  Multiple scattered bilateral centrilobular nodules, tree-in-bud, groundglass opacities and small consolidations, concerning for multifocal infection including particular consideration to nontuberculous mycobacterial infection.    HPI  84 year old female patient with pmhx CAD, HTN, GERD, heart valve problem who presented to the ER due to shortness of breath, cough, and generalized weakness for 7 days.  The patient tried taking Amoxicillin BID for the past 5 days (patient had old Amoxicillin lying around from the past); but, her symptoms did not improve. She went to Urgent Care but was hypoxic to the 80s so she was sent to the ER here. She has history also of valvular problem following Dr. Naranjo for Cardiology and states she needs a procedure to check on her heart valve through her esophagus (?ORLY).    Review Of Systems included: + shortness of breath, + generalized weakness, + cough with white sputum, + cold, + feeling unwell, + vomiting x1 yesterday, + chest pain on coughing, + headache, + chronic orthopnea,   No fever, no chills, no dyspnea on exertion    Granddaughter at bedside translated as per patient's preference  Physical Exam  General: Awake, Alert, Oriented  Cardiac: RRR  Pulmonary: Crackles b/l  Abdominal: Soft, ND, NT  Extremities: No edema b/l    A/P  # Acute Hypoxic Respiratory Failure  # Multifocal PNA  > Failure of outpatient Amoxicillin BID for 5 days  > 87% O2 saturation on room air  - Consult ID  - IV Cefepime  - IV Azithromycin  - Check Legionella/Strep Urine Antigen  - Check Mycoplasma IgM Antibody  - Check Sputum Culture  - Supplemental O2, wean down as tolerated    # Generalized Weakness  - PT Evaluation when appropriate  - Fall Precautions    # Hx of CAD, HTN, GERD, heart valve problem  - Continue home medications PPI, Montelukast    # DVT PPx  - Lovenox    # FEN  - DASH Diet  - Monitor and replete electrolytes as needed  - Fall precautions    FULL CODE    Previous Admissions Included  3/22/2025: Urgent Care Visit: Hypoxia. Dyspnea on exertion, Dry Cough, Patient took a family member's Amoxicillin 500mg BID x 5 days with no relief.   2/5/2024: ER Visit for diarrhea  12/10/2023 - 12/13/2023: Sepsis, Multifocal PNA, UTI    Patient case and management was discussed with ER Attending  I did examine all labs (including CBC, CMP, Troponin, Lactate, Mg, pro-BNP, COVID/Flu/RSV Test), imaging, prior notes

## 2025-03-23 NOTE — H&P ADULT - NSICDXPASTMEDICALHX_GEN_ALL_CORE_FT
PAST MEDICAL HISTORY:  CAD (coronary artery disease)     HTN (hypertension)      PAST MEDICAL HISTORY:  CAD (coronary artery disease)     GERD (gastroesophageal reflux disease)     HTN (hypertension)

## 2025-03-23 NOTE — H&P ADULT - PROBLEM SELECTOR PLAN 2
p/w AHRF with evidence of new bronchiectasis on CT  -CT chest: Mild diffuse bronchiectasis, especially pronounced in the lingula and lower lobes. Extensive patchy bilateral centrilobular tree-in-bud nodules, small consolidations, groundglass opacities, including wedge-shaped consolidation with surrounding groundglass in the anterior segment of the left upper lobe abuttingthe mediastinum, concerning for multifocal infection. Few scattered tiny calcified granulomata bilaterally.  -start duonebs q6 ATC, mucinex 1200mg q12 for airway clearance   -f/u sputum culture with AFB smear/culture  -pulm consult in AM

## 2025-03-24 LAB
ANION GAP SERPL CALC-SCNC: 10 MMOL/L — SIGNIFICANT CHANGE UP (ref 5–17)
BASOPHILS # BLD AUTO: 0.06 K/UL — SIGNIFICANT CHANGE UP (ref 0–0.2)
BASOPHILS NFR BLD AUTO: 0.7 % — SIGNIFICANT CHANGE UP (ref 0–2)
BUN SERPL-MCNC: 12 MG/DL — SIGNIFICANT CHANGE UP (ref 7–18)
CALCIUM SERPL-MCNC: 9.2 MG/DL — SIGNIFICANT CHANGE UP (ref 8.4–10.5)
CHLORIDE SERPL-SCNC: 104 MMOL/L — SIGNIFICANT CHANGE UP (ref 96–108)
CO2 SERPL-SCNC: 23 MMOL/L — SIGNIFICANT CHANGE UP (ref 22–31)
CREAT SERPL-MCNC: 0.91 MG/DL — SIGNIFICANT CHANGE UP (ref 0.5–1.3)
EGFR: 61 ML/MIN/1.73M2 — SIGNIFICANT CHANGE UP
EGFR: 61 ML/MIN/1.73M2 — SIGNIFICANT CHANGE UP
EOSINOPHIL # BLD AUTO: 0.16 K/UL — SIGNIFICANT CHANGE UP (ref 0–0.5)
EOSINOPHIL NFR BLD AUTO: 1.9 % — SIGNIFICANT CHANGE UP (ref 0–6)
GLUCOSE SERPL-MCNC: 154 MG/DL — HIGH (ref 70–99)
GRAM STN FLD: ABNORMAL
HCT VFR BLD CALC: 31.8 % — LOW (ref 34.5–45)
HGB BLD-MCNC: 10.8 G/DL — LOW (ref 11.5–15.5)
IMM GRANULOCYTES NFR BLD AUTO: 0.4 % — SIGNIFICANT CHANGE UP (ref 0–0.9)
LEGIONELLA AG UR QL: NEGATIVE — SIGNIFICANT CHANGE UP
LYMPHOCYTES # BLD AUTO: 1.56 K/UL — SIGNIFICANT CHANGE UP (ref 1–3.3)
LYMPHOCYTES # BLD AUTO: 18.3 % — SIGNIFICANT CHANGE UP (ref 13–44)
MAGNESIUM SERPL-MCNC: 2 MG/DL — SIGNIFICANT CHANGE UP (ref 1.6–2.6)
MCHC RBC-ENTMCNC: 29.4 PG — SIGNIFICANT CHANGE UP (ref 27–34)
MCHC RBC-ENTMCNC: 34 G/DL — SIGNIFICANT CHANGE UP (ref 32–36)
MCV RBC AUTO: 86.6 FL — SIGNIFICANT CHANGE UP (ref 80–100)
MONOCYTES # BLD AUTO: 0.84 K/UL — SIGNIFICANT CHANGE UP (ref 0–0.9)
MONOCYTES NFR BLD AUTO: 9.9 % — SIGNIFICANT CHANGE UP (ref 2–14)
NEUTROPHILS # BLD AUTO: 5.87 K/UL — SIGNIFICANT CHANGE UP (ref 1.8–7.4)
NEUTROPHILS NFR BLD AUTO: 68.8 % — SIGNIFICANT CHANGE UP (ref 43–77)
NRBC BLD AUTO-RTO: 0 /100 WBCS — SIGNIFICANT CHANGE UP (ref 0–0)
PHOSPHATE SERPL-MCNC: 3.1 MG/DL — SIGNIFICANT CHANGE UP (ref 2.5–4.5)
PLATELET # BLD AUTO: 356 K/UL — SIGNIFICANT CHANGE UP (ref 150–400)
POTASSIUM SERPL-MCNC: 3.2 MMOL/L — LOW (ref 3.5–5.3)
POTASSIUM SERPL-SCNC: 3.2 MMOL/L — LOW (ref 3.5–5.3)
RBC # BLD: 3.67 M/UL — LOW (ref 3.8–5.2)
RBC # FLD: 13.3 % — SIGNIFICANT CHANGE UP (ref 10.3–14.5)
SODIUM SERPL-SCNC: 137 MMOL/L — SIGNIFICANT CHANGE UP (ref 135–145)
SPECIMEN SOURCE: SIGNIFICANT CHANGE UP
WBC # BLD: 8.52 K/UL — SIGNIFICANT CHANGE UP (ref 3.8–10.5)
WBC # FLD AUTO: 8.52 K/UL — SIGNIFICANT CHANGE UP (ref 3.8–10.5)

## 2025-03-24 PROCEDURE — 99232 SBSQ HOSP IP/OBS MODERATE 35: CPT | Mod: GC

## 2025-03-24 PROCEDURE — 99223 1ST HOSP IP/OBS HIGH 75: CPT

## 2025-03-24 RX ADMIN — IPRATROPIUM BROMIDE AND ALBUTEROL SULFATE 3 MILLILITER(S): .5; 2.5 SOLUTION RESPIRATORY (INHALATION) at 02:53

## 2025-03-24 RX ADMIN — Medication 4 MILLILITER(S): at 20:19

## 2025-03-24 RX ADMIN — Medication 40 MILLIEQUIVALENT(S): at 15:50

## 2025-03-24 RX ADMIN — Medication 40 MILLIGRAM(S): at 06:15

## 2025-03-24 RX ADMIN — CEFEPIME 100 MILLIGRAM(S): 2 INJECTION, POWDER, FOR SOLUTION INTRAVENOUS at 17:45

## 2025-03-24 RX ADMIN — Medication 81 MILLIGRAM(S): at 11:50

## 2025-03-24 RX ADMIN — DEXTROMETHORPHAN HBR, GUAIFENESIN 1200 MILLIGRAM(S): 200 LIQUID ORAL at 17:45

## 2025-03-24 RX ADMIN — IPRATROPIUM BROMIDE AND ALBUTEROL SULFATE 3 MILLILITER(S): .5; 2.5 SOLUTION RESPIRATORY (INHALATION) at 14:55

## 2025-03-24 RX ADMIN — Medication 250 MILLIGRAM(S): at 04:54

## 2025-03-24 RX ADMIN — Medication 40 MILLIEQUIVALENT(S): at 09:34

## 2025-03-24 RX ADMIN — ROSUVASTATIN CALCIUM 40 MILLIGRAM(S): 5 TABLET, FILM COATED ORAL at 21:22

## 2025-03-24 RX ADMIN — DEXTROMETHORPHAN HBR, GUAIFENESIN 1200 MILLIGRAM(S): 200 LIQUID ORAL at 06:15

## 2025-03-24 RX ADMIN — CEFEPIME 100 MILLIGRAM(S): 2 INJECTION, POWDER, FOR SOLUTION INTRAVENOUS at 06:16

## 2025-03-24 RX ADMIN — IPRATROPIUM BROMIDE AND ALBUTEROL SULFATE 3 MILLILITER(S): .5; 2.5 SOLUTION RESPIRATORY (INHALATION) at 08:06

## 2025-03-24 RX ADMIN — Medication 1 TABLET(S): at 11:50

## 2025-03-24 RX ADMIN — ENOXAPARIN SODIUM 40 MILLIGRAM(S): 100 INJECTION SUBCUTANEOUS at 06:16

## 2025-03-24 RX ADMIN — Medication 4 MILLILITER(S): at 14:55

## 2025-03-24 RX ADMIN — IPRATROPIUM BROMIDE AND ALBUTEROL SULFATE 3 MILLILITER(S): .5; 2.5 SOLUTION RESPIRATORY (INHALATION) at 20:19

## 2025-03-24 NOTE — DIETITIAN INITIAL EVALUATION ADULT - ORAL INTAKE PTA/DIET HISTORY
Pt is from home, alert and orientated however sleeping at time of visit. H/o GERD, HTN, CAD, MI; admitted for PNA. Pt is with good PO intake in-house consuming % of meals as per flowsheet. Pt is noted with increased fatigue x 1 week and N/V episode prior to admission however no GI distress reported at this time. No chewing/ swallowing issues reported. NKFA. Weight hx in EMR reviewed indicating no significant wt changes.

## 2025-03-24 NOTE — DIETITIAN INITIAL EVALUATION ADULT - NSFNSGIIOFT_GEN_A_CORE
03-23-25 @ 07:01  -  03-24-25 @ 07:00  --------------------------------------------------------  OUT:    Stool (mL): 1 mL  Total OUT: 1 mL    Total NET: -1 mL      IBW: 115 lbs  Weight hx in EMR: 138.9 lbs (2/5/24)

## 2025-03-24 NOTE — DIETITIAN INITIAL EVALUATION ADULT - PERTINENT LABORATORY DATA
03-24    137  |  104  |  12  ----------------------------<  154[H]  3.2[L]   |  23  |  0.91    Ca    9.2      24 Mar 2025 06:18  Phos  3.1     03-24  Mg     2.0     03-24    TPro  7.5  /  Alb  2.9[L]  /  TBili  0.4  /  DBili  x   /  AST  16  /  ALT  18  /  AlkPhos  100  03-23

## 2025-03-24 NOTE — DIETITIAN INITIAL EVALUATION ADULT - PERTINENT MEDS FT
MEDICATIONS  (STANDING):  albuterol/ipratropium for Nebulization 3 milliLiter(s) Nebulizer every 6 hours  aspirin  chewable 81 milliGRAM(s) Oral daily  azithromycin  IVPB 500 milliGRAM(s) IV Intermittent every 24 hours  cefepime   IVPB 2000 milliGRAM(s) IV Intermittent every 12 hours  enoxaparin Injectable 40 milliGRAM(s) SubCutaneous every 24 hours  guaiFENesin ER 1200 milliGRAM(s) Oral every 12 hours  influenza  Vaccine (HIGH DOSE) 0.5 milliLiter(s) IntraMuscular once  multivitamin 1 Tablet(s) Oral daily  pantoprazole    Tablet 40 milliGRAM(s) Oral before breakfast  rosuvastatin 40 milliGRAM(s) Oral at bedtime  sodium chloride 3%  Inhalation 4 milliLiter(s) Inhalation every 8 hours    MEDICATIONS  (PRN):  acetaminophen     Tablet .. 650 milliGRAM(s) Oral every 6 hours PRN Temp greater or equal to 38C (100.4F), Mild Pain (1 - 3)  ondansetron Injectable 4 milliGRAM(s) IV Push every 8 hours PRN Nausea and/or Vomiting

## 2025-03-24 NOTE — PROGRESS NOTE ADULT - SUBJECTIVE AND OBJECTIVE BOX
PGY-1 Progress Note discussed with attending    INTERVAL HPI/OVERNIGHT EVENTS: Patient seen at bedside using [place]  Emi #[id number], states SOB is improving but endorses dry cough and chest pain with coughing, nonradiating, not pressure like or sharp. Otherwise no acute complaints.    MEDICATIONS  (STANDING):  albuterol/ipratropium for Nebulization 3 milliLiter(s) Nebulizer every 6 hours  aspirin  chewable 81 milliGRAM(s) Oral daily  azithromycin  IVPB 500 milliGRAM(s) IV Intermittent every 24 hours  cefepime   IVPB 2000 milliGRAM(s) IV Intermittent every 12 hours  enoxaparin Injectable 40 milliGRAM(s) SubCutaneous every 24 hours  guaiFENesin ER 1200 milliGRAM(s) Oral every 12 hours  influenza  Vaccine (HIGH DOSE) 0.5 milliLiter(s) IntraMuscular once  multivitamin 1 Tablet(s) Oral daily  pantoprazole    Tablet 40 milliGRAM(s) Oral before breakfast  potassium chloride   Powder 40 milliEquivalent(s) Oral every 4 hours  rosuvastatin 40 milliGRAM(s) Oral at bedtime  sodium chloride 3%  Inhalation 4 milliLiter(s) Inhalation every 8 hours    MEDICATIONS  (PRN):  acetaminophen     Tablet .. 650 milliGRAM(s) Oral every 6 hours PRN Temp greater or equal to 38C (100.4F), Mild Pain (1 - 3)  ondansetron Injectable 4 milliGRAM(s) IV Push every 8 hours PRN Nausea and/or Vomiting      REVIEW OF SYSTEMS:  CONSTITUTIONAL: No fever, weight loss, or fatigue  RESPIRATORY: +cough, wheezing, chills or hemoptysis; No shortness of breath  CARDIOVASCULAR: +chest pain, palpitations, dizziness, or leg swelling  GASTROINTESTINAL: No abdominal pain. No nausea, vomiting, or hematemesis; No diarrhea or constipation. No melena or hematochezia.  GENITOURINARY: No dysuria or hematuria, urinary frequency  NEUROLOGICAL: No headaches, memory loss, loss of strength, numbness, or tremors  SKIN: No itching, burning, rashes, or lesions     Vital Signs Last 24 Hrs  T(C): 36.7 (24 Mar 2025 04:57), Max: 36.9 (23 Mar 2025 20:46)  T(F): 98 (24 Mar 2025 04:57), Max: 98.4 (23 Mar 2025 20:46)  HR: 92 (24 Mar 2025 04:57) (89 - 92)  BP: 112/69 (24 Mar 2025 04:57) (99/60 - 112/69)  BP(mean): --  RR: 18 (24 Mar 2025 04:57) (18 - 18)  SpO2: 94% (24 Mar 2025 04:57) (94% - 95%)    Parameters below as of 24 Mar 2025 04:57  Patient On (Oxygen Delivery Method): room air        PHYSICAL EXAMINATION:  GENERAL: NAD, lying in bed comfortably  HEAD:  Atraumatic, Normocephalic  EYES: EOMI, PERRLA, conjunctiva and sclera clear  ENT: Moist mucous membranes  NECK: Supple, No JVD  CHEST/LUNG: CTLA; No rhonchi, wheezing, or rubs.  HEART: Regular rate and rhythm;  no rubs or gallops  ABDOMEN: BSx4; Soft, nontender, nondistended  EXTREMITIES:  2+ Peripheral Pulses, brisk capillary refill. No clubbing, cyanosis, or edema  NERVOUS SYSTEM:  A&Ox3, no focal deficits   SKIN: No rashes or lesions                        10.8   8.52  )-----------( 356      ( 24 Mar 2025 06:18 )             31.8     03-24    137  |  104  |  12  ----------------------------<  154[H]  3.2[L]   |  23  |  0.91    Ca    9.2      24 Mar 2025 06:18  Phos  3.1     03-24  Mg     2.0     03-24    TPro  7.5  /  Alb  2.9[L]  /  TBili  0.4  /  DBili  x   /  AST  16  /  ALT  18  /  AlkPhos  100  03-23    LIVER FUNCTIONS - ( 23 Mar 2025 05:58 )  Alb: 2.9 g/dL / Pro: 7.5 g/dL / ALK PHOS: 100 U/L / ALT: 18 U/L DA / AST: 16 U/L / GGT: x                   CAPILLARY BLOOD GLUCOSE      RADIOLOGY & ADDITIONAL TESTS:

## 2025-03-24 NOTE — PROGRESS NOTE ADULT - ASSESSMENT
84y F with PMH of GERD, CAD, previous MI 2017, and HTN presenting with 6 days of fatigue, cough, and shortness of breath Admitted to medicine for acute hypoxic respiratory failure 2/2 multifocal pneumonia and bronchiectasis.

## 2025-03-24 NOTE — DIETITIAN INITIAL EVALUATION ADULT - NSICDXPASTMEDICALHX_GEN_ALL_CORE_FT
PAST MEDICAL HISTORY:  CAD (coronary artery disease)     GERD (gastroesophageal reflux disease)     HTN (hypertension)

## 2025-03-24 NOTE — PROGRESS NOTE ADULT - PROBLEM SELECTOR PLAN 1
- p/w fatigue, SOB and productive cough with home spO2 reading 84% prior to admission  -spO2 as low as 88% on admission improved to 93-95% on 2L NC  -CT Chest shows multiple scattered bilateral centrilobular nodules, tree-in-bud, groundglass opacities and small consolidations, concerning for multifocal infection including particular consideration to nontuberculous   mycobacterial infection.  -Flu/Covid/RSV PCR negative  -s/p ceftriaxone/azithromycin in the ED  -c/w azithromycin 500mg qd   -c/w cefepime 2g q12 for pseudomonal coverage in s/o bronchiectasis  -c/w duonebs q6 ATC, mucinex 1200mg q12 for airway clearance   -albuterol HFA q6 PRN with sodium nebs via aerobika device  -f/u strep/legionella urine ag,  mycoplasma IgM  -incentive spirometry, OOBTC  -supportive care  -Pulm Dr. Conte

## 2025-03-24 NOTE — PROGRESS NOTE ADULT - PROBLEM SELECTOR PLAN 2
p/w AHRF with evidence of new bronchiectasis on CT  -CT chest: Mild diffuse bronchiectasis, especially pronounced in the lingula and lower lobes. Extensive patchy bilateral centrilobular tree-in-bud nodules, small consolidations, groundglass opacities, including wedge-shaped consolidation with surrounding groundglass in the anterior segment of the left upper lobe abuttingthe mediastinum, concerning for multifocal infection. Few scattered tiny calcified granulomata bilaterally.    SEE ABOVE

## 2025-03-25 LAB
ANION GAP SERPL CALC-SCNC: 8 MMOL/L — SIGNIFICANT CHANGE UP (ref 5–17)
BASOPHILS # BLD AUTO: 0.05 K/UL — SIGNIFICANT CHANGE UP (ref 0–0.2)
BASOPHILS NFR BLD AUTO: 0.6 % — SIGNIFICANT CHANGE UP (ref 0–2)
BUN SERPL-MCNC: 15 MG/DL — SIGNIFICANT CHANGE UP (ref 7–18)
CALCIUM SERPL-MCNC: 9.6 MG/DL — SIGNIFICANT CHANGE UP (ref 8.4–10.5)
CHLORIDE SERPL-SCNC: 106 MMOL/L — SIGNIFICANT CHANGE UP (ref 96–108)
CO2 SERPL-SCNC: 23 MMOL/L — SIGNIFICANT CHANGE UP (ref 22–31)
CREAT SERPL-MCNC: 0.9 MG/DL — SIGNIFICANT CHANGE UP (ref 0.5–1.3)
CULTURE RESULTS: ABNORMAL
EGFR: 62 ML/MIN/1.73M2 — SIGNIFICANT CHANGE UP
EGFR: 62 ML/MIN/1.73M2 — SIGNIFICANT CHANGE UP
EOSINOPHIL # BLD AUTO: 0.4 K/UL — SIGNIFICANT CHANGE UP (ref 0–0.5)
EOSINOPHIL NFR BLD AUTO: 4.8 % — SIGNIFICANT CHANGE UP (ref 0–6)
GLUCOSE SERPL-MCNC: 132 MG/DL — HIGH (ref 70–99)
HCT VFR BLD CALC: 33.8 % — LOW (ref 34.5–45)
HGB BLD-MCNC: 11.3 G/DL — LOW (ref 11.5–15.5)
IMM GRANULOCYTES NFR BLD AUTO: 0.4 % — SIGNIFICANT CHANGE UP (ref 0–0.9)
LYMPHOCYTES # BLD AUTO: 1.78 K/UL — SIGNIFICANT CHANGE UP (ref 1–3.3)
LYMPHOCYTES # BLD AUTO: 21.5 % — SIGNIFICANT CHANGE UP (ref 13–44)
M PNEUMO IGM SER-ACNC: 0.53 INDEX — SIGNIFICANT CHANGE UP (ref 0–0.9)
MAGNESIUM SERPL-MCNC: 1.9 MG/DL — SIGNIFICANT CHANGE UP (ref 1.6–2.6)
MCHC RBC-ENTMCNC: 29.5 PG — SIGNIFICANT CHANGE UP (ref 27–34)
MCHC RBC-ENTMCNC: 33.4 G/DL — SIGNIFICANT CHANGE UP (ref 32–36)
MCV RBC AUTO: 88.3 FL — SIGNIFICANT CHANGE UP (ref 80–100)
MONOCYTES # BLD AUTO: 0.77 K/UL — SIGNIFICANT CHANGE UP (ref 0–0.9)
MONOCYTES NFR BLD AUTO: 9.3 % — SIGNIFICANT CHANGE UP (ref 2–14)
MYCOPLASMA AG SPEC QL: NEGATIVE — SIGNIFICANT CHANGE UP
NEUTROPHILS # BLD AUTO: 5.26 K/UL — SIGNIFICANT CHANGE UP (ref 1.8–7.4)
NEUTROPHILS NFR BLD AUTO: 63.4 % — SIGNIFICANT CHANGE UP (ref 43–77)
NIGHT BLUE STAIN TISS: SIGNIFICANT CHANGE UP
NRBC BLD AUTO-RTO: 0 /100 WBCS — SIGNIFICANT CHANGE UP (ref 0–0)
PHOSPHATE SERPL-MCNC: 2.6 MG/DL — SIGNIFICANT CHANGE UP (ref 2.5–4.5)
PLATELET # BLD AUTO: 388 K/UL — SIGNIFICANT CHANGE UP (ref 150–400)
POTASSIUM SERPL-MCNC: 3.9 MMOL/L — SIGNIFICANT CHANGE UP (ref 3.5–5.3)
POTASSIUM SERPL-SCNC: 3.9 MMOL/L — SIGNIFICANT CHANGE UP (ref 3.5–5.3)
RBC # BLD: 3.83 M/UL — SIGNIFICANT CHANGE UP (ref 3.8–5.2)
RBC # FLD: 13.4 % — SIGNIFICANT CHANGE UP (ref 10.3–14.5)
S PNEUM AG UR QL: NEGATIVE — SIGNIFICANT CHANGE UP
SODIUM SERPL-SCNC: 137 MMOL/L — SIGNIFICANT CHANGE UP (ref 135–145)
SPECIMEN SOURCE: SIGNIFICANT CHANGE UP
SPECIMEN SOURCE: SIGNIFICANT CHANGE UP
WBC # BLD: 8.29 K/UL — SIGNIFICANT CHANGE UP (ref 3.8–10.5)
WBC # FLD AUTO: 8.29 K/UL — SIGNIFICANT CHANGE UP (ref 3.8–10.5)

## 2025-03-25 PROCEDURE — G0545: CPT

## 2025-03-25 PROCEDURE — 99222 1ST HOSP IP/OBS MODERATE 55: CPT

## 2025-03-25 PROCEDURE — 99232 SBSQ HOSP IP/OBS MODERATE 35: CPT

## 2025-03-25 RX ADMIN — DEXTROMETHORPHAN HBR, GUAIFENESIN 1200 MILLIGRAM(S): 200 LIQUID ORAL at 17:33

## 2025-03-25 RX ADMIN — IPRATROPIUM BROMIDE AND ALBUTEROL SULFATE 3 MILLILITER(S): .5; 2.5 SOLUTION RESPIRATORY (INHALATION) at 20:53

## 2025-03-25 RX ADMIN — Medication 250 MILLIGRAM(S): at 05:09

## 2025-03-25 RX ADMIN — ROSUVASTATIN CALCIUM 40 MILLIGRAM(S): 5 TABLET, FILM COATED ORAL at 21:40

## 2025-03-25 RX ADMIN — IPRATROPIUM BROMIDE AND ALBUTEROL SULFATE 3 MILLILITER(S): .5; 2.5 SOLUTION RESPIRATORY (INHALATION) at 08:30

## 2025-03-25 RX ADMIN — Medication 81 MILLIGRAM(S): at 11:29

## 2025-03-25 RX ADMIN — ENOXAPARIN SODIUM 40 MILLIGRAM(S): 100 INJECTION SUBCUTANEOUS at 06:58

## 2025-03-25 RX ADMIN — Medication 4 MILLILITER(S): at 11:14

## 2025-03-25 RX ADMIN — IPRATROPIUM BROMIDE AND ALBUTEROL SULFATE 3 MILLILITER(S): .5; 2.5 SOLUTION RESPIRATORY (INHALATION) at 03:03

## 2025-03-25 RX ADMIN — CEFEPIME 100 MILLIGRAM(S): 2 INJECTION, POWDER, FOR SOLUTION INTRAVENOUS at 17:34

## 2025-03-25 RX ADMIN — IPRATROPIUM BROMIDE AND ALBUTEROL SULFATE 3 MILLILITER(S): .5; 2.5 SOLUTION RESPIRATORY (INHALATION) at 14:36

## 2025-03-25 RX ADMIN — Medication 4 MILLILITER(S): at 20:53

## 2025-03-25 RX ADMIN — DEXTROMETHORPHAN HBR, GUAIFENESIN 1200 MILLIGRAM(S): 200 LIQUID ORAL at 05:08

## 2025-03-25 RX ADMIN — Medication 1 TABLET(S): at 11:29

## 2025-03-25 RX ADMIN — Medication 40 MILLIGRAM(S): at 05:08

## 2025-03-25 RX ADMIN — Medication 4 MILLILITER(S): at 03:03

## 2025-03-25 RX ADMIN — CEFEPIME 100 MILLIGRAM(S): 2 INJECTION, POWDER, FOR SOLUTION INTRAVENOUS at 06:58

## 2025-03-25 NOTE — PROGRESS NOTE ADULT - PROBLEM SELECTOR PLAN 1
- p/w fatigue, SOB and productive cough with home spO2 reading 84% prior to admission  -spO2 as low as 88% on admission improved to 93-95% on 2L NC  -CT Chest shows multiple scattered bilateral centrilobular nodules, tree-in-bud, groundglass opacities and small consolidations, concerning for multifocal infection including particular consideration to nontuberculous   mycobacterial infection.  -Flu/Covid/RSV PCR negative  -s/p ceftriaxone/azithromycin in the ED  -c/w azithromycin 500mg qd   -c/w cefepime 2g q12 for pseudomonal coverage in s/o bronchiectasis  -c/w duonebs q6 ATC, mucinex 1200mg q12 for airway clearance   -albuterol HFA q6 PRN with sodium nebs via aerobika device  -f/u strep/legionella urine ag,  mycoplasma IgM  -incentive spirometry, OOBTC  -supportive care  -Pulm Dr. Conte - p/w fatigue, SOB and productive cough with home spO2 reading 84% prior to admission  -spO2 as low as 88% on admission improved to 93-95% on 2L NC  -CT Chest shows multiple scattered bilateral centrilobular nodules, tree-in-bud, ground-glass opacities and small consolidations, concerning for multifocal infection including particular consideration to nontuberculous   mycobacterial infection.  -Flu/Covid/RSV PCR negative  -s/p ceftriaxone/azithromycin in the ED  - s/p azithromycin 500mg qd x 3 days   - strep/legionella urine ag, MRSA, Strep Pneumo negative  - Blood cultures negative  -c/w cefepime 2g q12 for pseudomonal coverage in s/o bronchiectasis  -c/w duonebs q6 ATC, mucinex 1200mg q12 for airway clearance   - c/w albuterol HFA q6 PRN with sodium nebs via aerobika device  - f/u  mycoplasma IgM  -c/w incentive spirometry, OOBTC  -c/w supportive care  - Pulm Dr. Conte following   - ID Dr. Donis following

## 2025-03-25 NOTE — PROGRESS NOTE ADULT - SUBJECTIVE AND OBJECTIVE BOX
Progress Note discussed with attending    MS TEAMS AUTHOR OF THIS NOTE TILL 5:00 PM  PLEASE CONTACT ON CALL TEAM:  - On Call Team (Please refer to Sandra) FROM 5:00 PM - 8:30PM  - Nightfloat Team FROM 8:30 -7:30 AM    CHIEF COMPLAINT & BRIEF HOSPITAL COURSE:    INTERVAL HPI/OVERNIGHT EVENTS:   MEDICATIONS  (STANDING):  albuterol/ipratropium for Nebulization 3 milliLiter(s) Nebulizer every 6 hours  aspirin  chewable 81 milliGRAM(s) Oral daily  cefepime   IVPB 2000 milliGRAM(s) IV Intermittent every 12 hours  enoxaparin Injectable 40 milliGRAM(s) SubCutaneous every 24 hours  guaiFENesin ER 1200 milliGRAM(s) Oral every 12 hours  influenza  Vaccine (HIGH DOSE) 0.5 milliLiter(s) IntraMuscular once  multivitamin 1 Tablet(s) Oral daily  pantoprazole    Tablet 40 milliGRAM(s) Oral before breakfast  rosuvastatin 40 milliGRAM(s) Oral at bedtime  sodium chloride 3%  Inhalation 4 milliLiter(s) Inhalation every 8 hours    MEDICATIONS  (PRN):  acetaminophen     Tablet .. 650 milliGRAM(s) Oral every 6 hours PRN Temp greater or equal to 38C (100.4F), Mild Pain (1 - 3)  ondansetron Injectable 4 milliGRAM(s) IV Push every 8 hours PRN Nausea and/or Vomiting      REVIEW OF SYSTEMS:  CONSTITUTIONAL: No fever, weight loss, or fatigue  RESPIRATORY: No shortness of breath  CARDIOVASCULAR: No chest pain  GASTROINTESTINAL: No abdominal pain.  GENITOURINARY: No dysuria  NEUROLOGICAL: No headaches  SKIN: No itching, burning, rashes    Vital Signs Last 24 Hrs  T(C): 36.4 (25 Mar 2025 04:40), Max: 36.7 (24 Mar 2025 14:29)  T(F): 97.5 (25 Mar 2025 04:40), Max: 98 (24 Mar 2025 14:29)  HR: 89 (25 Mar 2025 04:40) (89 - 93)  BP: 117/80 (25 Mar 2025 04:40) (117/80 - 124/63)  BP(mean): 91 (25 Mar 2025 04:40) (91 - 91)  RR: 17 (25 Mar 2025 04:40) (17 - 17)  SpO2: 94% (25 Mar 2025 04:40) (93% - 96%)    Parameters below as of 25 Mar 2025 04:40  Patient On (Oxygen Delivery Method): room air        PHYSICAL EXAMINATION:  GENERAL: NAD, well built  HEAD:  Atraumatic, Normocephalic  EYES:  conjunctiva and sclera clear  CHEST/LUNG: Clear to auscultation. No rales, rhonchi, wheezing, or rubs  HEART: Regular rate and rhythm; No murmurs, rubs, or gallops  ABDOMEN: Soft, Nontender, Nondistended; Bowel sounds present  NERVOUS SYSTEM:  Alert & Oriented X3,    EXTREMITIES:  2+ Peripheral Pulses, No clubbing, cyanosis, or edema  SKIN: warm dry                          10.8   8.52  )-----------( 356      ( 24 Mar 2025 06:18 )             31.8     03-24    137  |  104  |  12  ----------------------------<  154[H]  3.2[L]   |  23  |  0.91    Ca    9.2      24 Mar 2025 06:18  Phos  3.1     03-24  Mg     2.0     03-24                CAPILLARY BLOOD GLUCOSE      RADIOLOGY & ADDITIONAL TESTS:                   Progress Note discussed with attending    MS TEAMS AUTHOR OF THIS NOTE TILL 5:00 PM  PLEASE CONTACT ON CALL TEAM:  - On Call Team (Please refer to Sandra) FROM 5:00 PM - 8:30PM  - Nightfloat Team FROM 8:30 -7:30 AM    CHIEF COMPLAINT & BRIEF HOSPITAL COURSE: Citizen of [place]  used # [id number], no acute events overnight, patient seen and examined at bedside. Patient complains of cough that is productive, sputum is clear. Reports no other active complains.     INTERVAL HPI/OVERNIGHT EVENTS:   MEDICATIONS  (STANDING):  albuterol/ipratropium for Nebulization 3 milliLiter(s) Nebulizer every 6 hours  aspirin  chewable 81 milliGRAM(s) Oral daily  cefepime   IVPB 2000 milliGRAM(s) IV Intermittent every 12 hours  enoxaparin Injectable 40 milliGRAM(s) SubCutaneous every 24 hours  guaiFENesin ER 1200 milliGRAM(s) Oral every 12 hours  influenza  Vaccine (HIGH DOSE) 0.5 milliLiter(s) IntraMuscular once  multivitamin 1 Tablet(s) Oral daily  pantoprazole    Tablet 40 milliGRAM(s) Oral before breakfast  rosuvastatin 40 milliGRAM(s) Oral at bedtime  sodium chloride 3%  Inhalation 4 milliLiter(s) Inhalation every 8 hours    MEDICATIONS  (PRN):  acetaminophen     Tablet .. 650 milliGRAM(s) Oral every 6 hours PRN Temp greater or equal to 38C (100.4F), Mild Pain (1 - 3)  ondansetron Injectable 4 milliGRAM(s) IV Push every 8 hours PRN Nausea and/or Vomiting      REVIEW OF SYSTEMS:  CONSTITUTIONAL: No fever, weight loss, or fatigue  RESPIRATORY: No shortness of breath, + cough   CARDIOVASCULAR: No chest pain  GASTROINTESTINAL: No abdominal pain.  GENITOURINARY: No dysuria  NEUROLOGICAL: No headaches  SKIN: No itching, burning, rashes    Vital Signs Last 24 Hrs  T(C): 36.4 (25 Mar 2025 04:40), Max: 36.7 (24 Mar 2025 14:29)  T(F): 97.5 (25 Mar 2025 04:40), Max: 98 (24 Mar 2025 14:29)  HR: 89 (25 Mar 2025 04:40) (89 - 93)  BP: 117/80 (25 Mar 2025 04:40) (117/80 - 124/63)  BP(mean): 91 (25 Mar 2025 04:40) (91 - 91)  RR: 17 (25 Mar 2025 04:40) (17 - 17)  SpO2: 94% (25 Mar 2025 04:40) (93% - 96%)    Parameters below as of 25 Mar 2025 04:40  Patient On (Oxygen Delivery Method): room air        PHYSICAL EXAMINATION:  GENERAL: NAD, laying in bed comfortable   HEAD:  Atraumatic, Normocephalic  EYES:  conjunctiva and sclera clear  CHEST/LUNG: mild crackles heard through out  HEART: Regular rate and rhythm; No murmurs, rubs, or gallops  ABDOMEN: Soft, Nontender, Nondistended; Bowel sounds present  NERVOUS SYSTEM:  Alert & Oriented X3,    EXTREMITIES:  2+ Peripheral Pulses, No clubbing, cyanosis, or edema  SKIN: warm dry                          10.8   8.52  )-----------( 356      ( 24 Mar 2025 06:18 )             31.8     03-24    137  |  104  |  12  ----------------------------<  154[H]  3.2[L]   |  23  |  0.91    Ca    9.2      24 Mar 2025 06:18  Phos  3.1     03-24  Mg     2.0     03-24                CAPILLARY BLOOD GLUCOSE      RADIOLOGY & ADDITIONAL TESTS:

## 2025-03-25 NOTE — PROGRESS NOTE ADULT - TIME BILLING
- personally reviewed pt's labs, VS/flowsheets, pertinent imaging, and consultant notes  - bedside SpO2 measurement to determine supplemental O2 needs  - general pulm hx/exam  - medication reconciliation  - coordination of care with primary team
-Review of hospital course, labs, vitals, medical records  -Bedside exam and interview  -Discussed plan and coordinated care with ACP/housestaff, family, specialists, /  -Documenting the encounter.

## 2025-03-25 NOTE — DISCHARGE NOTE NURSING/CASE MANAGEMENT/SOCIAL WORK - PATIENT PORTAL LINK FT
You can access the FollowMyHealth Patient Portal offered by [facility] by registering at the following website: [email or web address]. By joining [facility]’s FollowMyHealth portal, you will also be able to view your health information using other applications (apps) compatible with our system.

## 2025-03-25 NOTE — DISCHARGE NOTE NURSING/CASE MANAGEMENT/SOCIAL WORK - NSDCPEFALRISK_GEN_ALL_CORE
For information on Fall & Injury Prevention, visit: [email or web address] OR  [email or web address] OR  https://www.cdc.gov/steadi/patient.html

## 2025-03-25 NOTE — PHYSICAL THERAPY INITIAL EVALUATION ADULT - GENERAL OBSERVATIONS, REHAB EVAL
female patient, resting in bed after meals, refer for PT assessment, presented functional rw transfers and level to incline gait with assistance to low endurance

## 2025-03-25 NOTE — CONSULT NOTE ADULT - SUBJECTIVE AND OBJECTIVE BOX
HPI:  84y F with PMH of GERD, CAD, previous MI 2017, and HTN presenting with 6 days of fatigue, cough, and shortness of breath. Patient accompanied by granddaughter providing collateral and [place] translation at bedside. States that around 6-7 days ago patient started to feel "under the weather", endorsing fatigue, shortness of breath and cough productive of white frothy sputum. Patient started taking old Rx of Amoxicillin 1000mg BID 6 days prior to admission without improvement of symptoms. Reports that family checked pulse ox daily and today saw that spO2 had dropped to 84-85% prompting a visit to urgent care before being sent to ED. Patient was admitted to [facility] in Dec. 2023 for multifocal PNA presenting with similar complaints.  Patient reports following with cardiologist and says pending a procedure to assess heart valve with patient description of procedure likely ORLY. Endorses 2 pillow orthopnea and dyspnea with brisk walking at baseline. Currently complaining of diffuse anterior chest pain when coughing, shortness of breath, productive cough, fatigue, and headache. Reports one episode of nausea and vomiting after drinking cough syrup yesterday. Denies fever, chills, dizziness, palpitations, abdominal pain, nausea, diarrhea, constipation, and dysuria. Denies known exposure to TB and other environmental respiratory exposures including second hand smoke.    In the ED:  T(F): , Max: 98.5 (00:43); HR:  (83 - 84); BP:  (123/83 - 126/76); RR:  (18 - 21); SpO2:  (94% - 94%)  WBC:8.92, Hb.0, PLT:357  Na:137, K:3.9, Cl:106, HCO3:27, BUN:19, sCr:0.91, Lactate: 1.0  AST:19, ALT:21, ALP:114, Tbili:0.2  Troponin:22.0, p-BNP:323  s/p azithromycin  IVPB 500 milliGRAM(s); cefTRIAXone   IVPB 1000 milliGRAM(s) (23 Mar 2025 02:19)    At the time of my consult, pt's daughter at the bedside. I utilized a [place] speaking healthcare provider to help with translation. Pt overall feeling better. Still c/o cough (non-productive at this time) and weakness. No other complaints.      PAST MEDICAL & SURGICAL HISTORY:  CAD (coronary artery disease)      HTN (hypertension)      GERD (gastroesophageal reflux disease)          MEDS:  acetaminophen     Tablet .. 650 milliGRAM(s) Oral every 6 hours PRN  albuterol/ipratropium for Nebulization 3 milliLiter(s) Nebulizer every 6 hours  aspirin  chewable 81 milliGRAM(s) Oral daily  cefepime   IVPB 2000 milliGRAM(s) IV Intermittent every 12 hours (started )  enoxaparin Injectable 40 milliGRAM(s) SubCutaneous every 24 hours  guaiFENesin ER 1200 milliGRAM(s) Oral every 12 hours  influenza  Vaccine (HIGH DOSE) 0.5 milliLiter(s) IntraMuscular once  multivitamin 1 Tablet(s) Oral daily  ondansetron Injectable 4 milliGRAM(s) IV Push every 8 hours PRN  pantoprazole    Tablet 40 milliGRAM(s) Oral before breakfast  rosuvastatin 40 milliGRAM(s) Oral at bedtime  sodium chloride 3%  Inhalation 4 milliLiter(s) Inhalation every 8 hours  azithro -  CTX       ALLERGIES  iodine (Hives)  pollen      SOCIAL HISTORY: emigrated from [place] ([place]) yrs ago; lives with daughter; no cigs or ETOH    FAMILY HISTORY:      ROS:    General:	feeling better (see above HPI) although still c/o weakness    Skin: no complaints  	  HEENT: no complaints    Respiratory and Thorax: had SOB PTA; cont. to have a cough  	  Cardiovascular:	no complaints (see HPI)    GI: no N, V, diarrhea	    Genitourinary:	no urinary tract complaints    Musculoskeletal:	 no complaints    Neurological:	no complaints      PHYSICAL EXAM:    Vital Signs Last 24 Hrs  T(C): 36.4 (25 Mar 2025 13:46), Max: 36.4 (25 Mar 2025 04:40)  T(F): 97.5 (25 Mar 2025 13:46), Max: 97.5 (25 Mar 2025 04:40)  HR: 85 (25 Mar 2025 13:46) (85 - 93)  BP: 108/64 (25 Mar 2025 13:46) (108/64 - 124/63)  BP(mean): 91 (25 Mar 2025 04:40) (91 - 91)  RR: 17 (25 Mar 2025 13:46) (17 - 17)  SpO2: 95% (25 Mar 2025 13:46) (90% - 96%)     Parameters below as of 25 Mar 2025 13:46  Patient On (Oxygen Delivery Method): room air          Gen: alert, responsive and in NAD    HEENT: NC/AT; conj. clear; mouth-- good oral hygiene    Neck: supple    Lymph Nodes: no enlarged submand, cervical or supraclav LNs    Chest/Thorax: + rhonchi; basilar rales    Cardiovascular: reg rhythm,  II-III/VI MYKE best heard in aortic area    ABD: soft and non-tender with active BS    Genitourinary: no beyer    Extremities: no LE swelling or erythema    Neurological: A+Ox3; toes downgoing; no focal findings    Skin: no rashes noted    Psychiatric: affect appropriate      LABS/DIAGNOSTIC TESTS:                          11.3   8.29  )-----------( 388      ( 25 Mar 2025 06:27 )             33.8     WBC Count: 8.29 K/uL ( @ 06:27)  WBC Count: 8.52 K/uL ( @ 06:18)  WBC Count: 7.44 K/uL ( @ 05:58)  WBC Count: 8.92 K/uL ( @ 22:15)          137  |  106  |  15  ----------------------------<  132[H]  3.9   |  23  |  0.90    Ca    9.6      25 Mar 2025 06:27  Phos  2.6       Mg     1.9           Legionella pneumophila Antigen, Urine (25 @ 08:54)   Legionella Antigen, Urine: Negative:       CULTURES:     Culture - Sputum (collected 25 @ 14:37)  Source: Sputum Sputum  Gram Stain (25 @ 00:04):    Rare polymorphonuclear leukocytes per low power field    No squamous epithelial cells per low power field    Rare Gram Positive Cocci in Pairs and Chains per oil power field    Rare Gram Negative Rods per oil power field  Final Report (25 @ 00:20):    Commensal armaan consistent with body site    Culture - Blood (collected 25 @ 22:25)  Source: Blood Blood-Peripheral  Preliminary Report (25 @ 03:01):    No growth at 48 Hours    Culture - Blood (collected 25 @ 22:15)  Source: Blood Blood-Peripheral  Preliminary Report (25 @ 03:01):    No growth at 48 Hours        RADIOLOGY  < from: CT Chest No Cont (25 @ 23:27) >    ACC: [id number] EXAM:  CT CHEST   ORDERED BY: ELAINA KOENIG     PROCEDURE DATE:  2025          INTERPRETATION:  CLINICAL INFORMATION: Cough, shortness of breath    COMPARISON: CTA chest 2023    CONTRAST/COMPLICATIONS:  IV Contrast: NONE  Oral Contrast: NONE  .    PROCEDURE:  CT of the Chest was performed.  Sagittal and coronal reformats were performed.    FINDINGS:    LUNGS AND LARGE AIRWAYS: Patent central airways. Mild diffuse   bronchiectasis, especially pronounced in the lingula and lower lobes.   Extensive patchy bilateral centrilobular tree-in-bud nodules, small   consolidations, groundglass opacities, including wedge-shaped   consolidation with surrounding groundglass in the anterior segment of the   left upper lobe abuttingthe mediastinum, concerning for multifocal   infection. Few scattered tiny calcified granulomata bilaterally.  PLEURA: No pleural effusion.  VESSELS: Aortic and coronary artery calcifications, including aortic   valvular calcifications. Ascending thoracic aorta measures 4 cm.  HEART: Heart size is normal. No pericardial effusion.  MEDIASTINUM AND PONCHO: Calcified nonenlarged mediastinal and right hilar   lymph nodes, likely due to old granulomatous disease. Multiple   subcentimeter short axis mediastinal lymph nodes, none enlarged by size   criteria.  CHEST WALL AND LOWER NECK: Within normal limits.  VISUALIZED UPPER ABDOMEN: Cholecystectomy.  BONES: Degenerative images. L1 vertebroplasty. Stable Schmorl's node at   the inferior endplate of T12.    IMPRESSION:  Multiple scattered bilateral centrilobular nodules, tree-in-bud,   groundglass opacities and small consolidations, concerning for multifocal   infection including particular consideration to nontuberculous   mycobacterial infection.                        
PULMONARY SERVICE INITIAL CONSULT NOTE    HPI:  84y F with PMH of GERD, CAD, previous MI 2017, and HTN presenting with 6 days of fatigue, cough, and shortness of breath. Patient accompanied by granddaughter providing collateral and [place]ian translation at bedside. States that around 6-7 days ago patient started to feel "under the weather", endorsing fatigue, shortness of breath and cough productive of white frothy sputum. Patient started taking old Rx of Amoxicillin 1000mg BID 6 days prior to admission without improvement of symptoms. Reports that family checked pulse ox daily and today saw that spO2 had dropped to 84-85% prompting a visit to urgent care before being sent to ED. Patient was admitted to [facility] in Dec. 2023 for multifocal PNA presenting with similar complaints.  Patient reports following with cardiologist and says pending a procedure to assess heart valve with patient description of procedure likely ORLY. Endorses 2 pillow orthopnea and dyspnea with brisk walking at baseline. Currently complaining of diffuse anterior chest pain when coughing, shortness of breath, productive cough, fatigue, and headache. Reports one episode of nausea and vomiting after drinking cough syrup yesterday. Denies fever, chills, dizziness, palpitations, abdominal pain, nausea, diarrhea, constipation, and dysuria. Denies known exposure to TB and other environmental respiratory exposures including second hand smoke,     REVIEW OF SYSTEMS:  Constitutional: No fever, weight loss or fatigue  Eyes: No eye pain, visual disturbances, or discharge  ENMT:  No difficulty hearing, tinnitus, vertigo; No sinus or throat pain  Neck: No pain, stiffness or neck swelling  Respiratory: see HPI  Cardiovascular: No chest pain, palpitations, dizziness or leg swelling  Gastrointestinal: No abdominal or epigastric pain. No nausea, vomiting or hematemesis; No diarrhea or constipation. No melena or hematochezia.  Genitourinary: No dysuria, frequency, hematuria or incontinence  Neurological: No headaches, memory loss, loss of strength, numbness or tremors  Skin: No itching, burning, rashes or lesions   Lymph Nodes: No enlarged glands  Endocrine: No heat or cold intolerance; No hair loss  Musculoskeletal: No joint pain or swelling; No muscle, back or extremity pain  Psychiatric: No depression, anxiety, mood swings or difficulty sleeping  Heme/Lymph: No easy bruising or bleeding gums  Allergy and Immunologic: No hives or eczema    PAST MEDICAL & SURGICAL HISTORY:  CAD (coronary artery disease)      HTN (hypertension)      GERD (gastroesophageal reflux disease)    FAMILY HISTORY:  Denies FHx lung disease in M/F    SOCIAL HISTORY:  Smoking Status: [ ] Current, [ ] Former, [X] Never  Pack Years:    MEDICATIONS:  Pulmonary:  albuterol/ipratropium for Nebulization 3 milliLiter(s) Nebulizer every 6 hours  guaiFENesin ER 1200 milliGRAM(s) Oral every 12 hours    Antimicrobials:  azithromycin  IVPB 500 milliGRAM(s) IV Intermittent every 24 hours  cefepime   IVPB 2000 milliGRAM(s) IV Intermittent every 12 hours    Anticoagulants:  aspirin  chewable 81 milliGRAM(s) Oral daily  enoxaparin Injectable 40 milliGRAM(s) SubCutaneous every 24 hours    Onc:    GI/:  pantoprazole    Tablet 40 milliGRAM(s) Oral before breakfast    Endocrine:  rosuvastatin 40 milliGRAM(s) Oral at bedtime    Cardiac:    Other Medications:  acetaminophen     Tablet .. 650 milliGRAM(s) Oral every 6 hours PRN  influenza  Vaccine (HIGH DOSE) 0.5 milliLiter(s) IntraMuscular once  multivitamin 1 Tablet(s) Oral daily  ondansetron Injectable 4 milliGRAM(s) IV Push every 8 hours PRN  potassium chloride   Powder 40 milliEquivalent(s) Oral every 4 hours    Allergies    No Known Allergies    Intolerances    Vital Signs Last 24 Hrs  T(C): 36.7 (24 Mar 2025 04:57), Max: 36.9 (23 Mar 2025 20:46)  T(F): 98 (24 Mar 2025 04:57), Max: 98.4 (23 Mar 2025 20:46)  HR: 92 (24 Mar 2025 04:57) (74 - 92)  BP: 112/69 (24 Mar 2025 04:57) (99/60 - 112/69)  BP(mean): --  RR: 18 (24 Mar 2025 04:57) (18 - 18)  SpO2: 94% (24 Mar 2025 04:57) (94% - 95%)    Parameters below as of 24 Mar 2025 04:57  Patient On (Oxygen Delivery Method): room air    03-23 @ 07:01  -  03-24 @ 07:00  --------------------------------------------------------  IN: 0 mL / OUT: 1 mL / NET: -1 mL    PHYSICAL EXAM:  Constitutional: non-toxic appearing woman in bed; NAD  Head: atraumatic  EENT: PERRL, anicteric sclera; oropharynx clear, MMM  Neck: supple, no appreciable JVD  Respiratory: few scattered rhonchi and crackles b/l  Cardiovascular: +S1/S2, RRR  Gastrointestinal: soft, NT/ND  Extremities: WWP; no edema, clubbing or cyanosis  Vascular: 2+ radial pulses B/L  Neurological: awake and alert; LERMA    LABS:  CBC Full  -  ( 24 Mar 2025 06:18 )  WBC Count : 8.52 K/uL  RBC Count : 3.67 M/uL  Hemoglobin : 10.8 g/dL  Hematocrit : 31.8 %  Platelet Count - Automated : 356 K/uL  Mean Cell Volume : 86.6 fl  Mean Cell Hemoglobin : 29.4 pg  Mean Cell Hemoglobin Concentration : 34.0 g/dL  Auto Neutrophil # : x  Auto Lymphocyte # : x  Auto Monocyte # : x  Auto Eosinophil # : x  Auto Basophil # : x  Auto Neutrophil % : x  Auto Lymphocyte % : x  Auto Monocyte % : x  Auto Eosinophil % : x  Auto Basophil % : x    03-24    137  |  104  |  12  ----------------------------<  154[H]  3.2[L]   |  23  |  0.91    Ca    9.2      24 Mar 2025 06:18  Phos  3.1     03-24  Mg     2.0     03-24    TPro  7.5  /  Alb  2.9[L]  /  TBili  0.4  /  DBili  x   /  AST  16  /  ALT  18  /  AlkPhos  100  03-23    Urinalysis Basic - ( 24 Mar 2025 06:18 )    Color: x / Appearance: x / SG: x / pH: x  Gluc: 154 mg/dL / Ketone: x  / Bili: x / Urobili: x   Blood: x / Protein: x / Nitrite: x   Leuk Esterase: x / RBC: x / WBC x   Sq Epi: x / Non Sq Epi: x / Bacteria: x    RADIOLOGY & ADDITIONAL STUDIES (personally reviewed):  < from: CT Chest No Cont (03.22.25 @ 23:27) >  FINDINGS:    LUNGS AND LARGE AIRWAYS: Patent central airways. Mild diffuse   bronchiectasis, especially pronounced in the lingula and lower lobes.   Extensive patchy bilateral centrilobular tree-in-bud nodules, small   consolidations, groundglass opacities, including wedge-shaped   consolidation with surrounding groundglass in the anterior segment of the   left upper lobe abuttingthe mediastinum, concerning for multifocal   infection. Few scattered tiny calcified granulomata bilaterally.  PLEURA: No pleural effusion.  VESSELS: Aortic and coronary artery calcifications, including aortic   valvular calcifications. Ascending thoracic aorta measures 4 cm.  HEART: Heart size is normal. No pericardial effusion.  MEDIASTINUM AND PONCHO: Calcified nonenlarged mediastinal and right hilar   lymph nodes, likely due to old granulomatous disease. Multiple   subcentimeter short axis mediastinal lymph nodes, none enlarged by size   criteria.  CHEST WALL AND LOWER NECK: Within normal limits.    IMPRESSION:  Multiple scattered bilateral centrilobular nodules, tree-in-bud,   groundglass opacities and small consolidations, concerning for multifocal   infection including particular consideration to nontuberculous   mycobacterial infection.

## 2025-03-25 NOTE — PROGRESS NOTE ADULT - ASSESSMENT
83yo woman never smoker w/ PMH GERD, CAD with 1 week of general malaise, dyspnea/HOLT, cough of white frothy sputum; found on CT chest w/ multiple bilateral consolidative and GGOs w/ TiB nodularity, bronchiectasis, and admitted for further mgmt.    CT findings most suggestive of bronchiectasis w/ foci of mucoid impaction but also with some small consolidations. Majority of findings can reflect non-tuberculous mycobacterial (NTM) disease but cannot exclude component of superimposed PNA/atypical infection.    #Bronchiectasis  #Mucus impaction  #Pulmonary nodules  #R/o MF PNA    Recommendations:  - monitor off supplemental O2  - agree w/ broad spectrum ABx for now  - f/u peripheral ID workup, atypical PNA studies  - start aggressive airway clearance regimen:  --> duonebs q6h standing ATC  --> add 3% sodium chloride nebs q6-8h standing (ideally timed with duoneb)  --> add Aerobika device to be used during nebulizer treatments  --> mobilize OOBTC and ambulate as tolerated daily  - check sputum AFB culture w/ smear (pt does NOT require isolation for this, it is to look for NON-tuberculous mycobacterial disease)  - incentive citlalli 10x/hr while awake    Patient specifically requested outpatient pulmonary referral to a [facility]-speaking pulmonologist - we discussed either Dr. Kvng Koch or Dr. Eliz Mccollum as options in this respect; kindly provide their office info for f/u.

## 2025-03-25 NOTE — PHYSICAL THERAPY INITIAL EVALUATION ADULT - GAIT DEVIATIONS NOTED, PT EVAL
Patient presents to ED with chief complaint of upper back pain. Denies any trauma. States it started tonight. Was seen at the immediate care who sent her here for further eval. BP high in triage, EKG done.   stable balance with assistance of dme

## 2025-03-25 NOTE — DISCHARGE NOTE NURSING/CASE MANAGEMENT/SOCIAL WORK - FINANCIAL ASSISTANCE
[facility] provides services at a reduced cost to those who are determined to be eligible through [facility]’s financial assistance program. Information regarding [facility]’s financial assistance program can be found by going to [email or web address] or by calling [phone].

## 2025-03-25 NOTE — PROGRESS NOTE ADULT - ATTENDING COMMENTS
# Acute Hypoxic Respiratory Failure  # Multifocal PNA  # Hypokalemia  # Generalized Weakness  # Hx of CAD  # HTN  # GERD  # DVT ppx    84yF with PMH of CAD, HTN, GERD, heart valve problem who presented to the ER due to shortness of breath, cough, and generalized weakness for 7 days. Patient took a course of amoxicillin (from home, medication was 6 years old), without relief of her symptoms, and went to urgent care where she was found hypoxic to the 80s and referred to the ED for further evaluation. Admitted with multifocal pneumonia. Patient seen at bedside, resting comfortably. States that she feels better than on admission, however, has some redness and burning on the tongue. Notes cough productive of white sputum (was yellow previously). Ambulating well. Medical staff provided translation at bedside.     PE: as above; vitals reviewed, NAD, abdomen soft/nontender, A+Ox2, no le edema  Labs reviewed: CBC, BMP, Mg, Phos; monitor daily     -c/w cefepime and azithromycin   -cefepime started due to bronchiectasis seen on imaging to cover pseudomonas   -CT chest showing tree-in-bud, groundglass opacities and small consolidations  -atypical workup pending   -BCx NGTD  -afebrile, no leukocytosis   -saturating appropriately on RA   -c/w home meds for chronic conditions   -pulm Dr. Conte following   -PT eval pending   -DVT ppx
84F PMH CAD, HTN, GERD p/w cc SOB, cough, admission for AHRF, bronchiectasis and multifocal pneumonia.     AP:  AHRF  multifocal pna  bronchiectasis  CAD   HTN  GERD    -c/w cefepime and azithromycin  -consult ID  -standing nebs  -OOBC  -c/w home asa and statin  -resume home meds  -PT rec home PT  -dvt ppx

## 2025-03-25 NOTE — CONSULT NOTE ADULT - ASSESSMENT
84y F with PMH of GERD, CAD, previous MI 2017, and HTN presenting with 6 days of fatigue, cough, shortness of breath but no fever. Patient started taking old Rx of Amoxicillin 1000mg BID 6 days prior to admission without improvement of symptoms. Reports that family checked pulse ox daily and today saw that spO2 had dropped to 84-85% prompting a visit to urgent care before being sent to ED. Patient was admitted to [facility] in Dec. 2023 for multifocal PNA presenting with similar complaints. CT chest with multiple scattered bilateral centrilobular nodules, tree-in-bud, groundglass opacities and small consolidations, concerning for multifocal infection including nontuberculous mycobacterial infection. At the time of my consult, pt looks very comfortable although still with a dry cough. Sputum Cx: normal armaan. Appreciate Pulm input. As per Pulmonary, CT findings suggestive of bronchiectasis w/ foci of mucoid impaction but also with some small consolidations. Pt has received cefepime and azithro. Remained afebrile since adm with a normal WBC and negative BCs.     # Pneumonia--see above  --d/c cefepime and azithro  --treat with levofloxacin 500mg p.o. qd x5d  --f/u Pulm recommendations for airway management and w/u of atypical mycobacterial infection    Chart reviewed, including notes/labs/Cx/imaging; pt examined at the bedside; discussed the Dx/treatment of pneumonia with the pt, pt's daughter, and Dr. Hernandes; provided coordination of care re ID issues with Dr. Hernandes. Please call again if needed.    
85yo woman never smoker w/ PMH GERD, CAD with 1 week of general malaise, dyspnea/HOLT, cough of white frothy sputum; found on CT chest w/ multiple bilateral consolidative and GGOs w/ TiB nodularity, bronchiectasis, and admitted for further mgmt.    CT findings most suggestive of bronchiectasis w/ foci of mucoid impaction but also with some small consolidations. Majority of findings can reflect non-tuberculous mycobacterial (NTM) disease but cannot exclude component of superimposed PNA/atypical infection.    #Bronchiectasis  #Mucus impaction  #Pulmonary nodules  #R/o MF PNA    Recommendations:  - monitor off supplemental O2  - agree w/ broad spectrum ABx for now  - f/u peripheral ID workup, atypical PNA studies  - start aggressive airway clearance regimen:  --> duonebs q6h standing ATC  --> add 3% sodium chloride nebs q6-8h standing (ideally timed with duoneb)  --> add Aerobika device to be used during nebulizer treatments  --> mobilize OOBTC and ambulate as tolerated daily  - check sputum AFB culture w/ smear (pt does NOT require isolation for this, it is to look for NON-tuberculous mycobacterial disease)  - incentive citlalli 10x/hr while awake

## 2025-03-25 NOTE — PROGRESS NOTE ADULT - SUBJECTIVE AND OBJECTIVE BOX
PULMONARY CONSULT SERVICE FOLLOW-UP NOTE    INTERVAL HPI:  Reviewed chart and overnight events; patient seen and examined at bedside.    MEDICATIONS:  Pulmonary:  albuterol/ipratropium for Nebulization 3 milliLiter(s) Nebulizer every 6 hours  guaiFENesin ER 1200 milliGRAM(s) Oral every 12 hours  sodium chloride 3%  Inhalation 4 milliLiter(s) Inhalation every 8 hours    Antimicrobials:  cefepime   IVPB 2000 milliGRAM(s) IV Intermittent every 12 hours    Anticoagulants:  aspirin  chewable 81 milliGRAM(s) Oral daily  enoxaparin Injectable 40 milliGRAM(s) SubCutaneous every 24 hours    Cardiac:      Allergies    iodine (Hives)    Intolerances    Vital Signs Last 24 Hrs  T(C): 36.4 (25 Mar 2025 04:40), Max: 36.7 (24 Mar 2025 14:29)  T(F): 97.5 (25 Mar 2025 04:40), Max: 98 (24 Mar 2025 14:29)  HR: 85 (25 Mar 2025 09:35) (85 - 93)  BP: 117/80 (25 Mar 2025 04:40) (117/80 - 124/63)  BP(mean): 91 (25 Mar 2025 04:40) (91 - 91)  RR: 17 (25 Mar 2025 04:40) (17 - 17)  SpO2: 90% (25 Mar 2025 09:35) (90% - 96%)    Parameters below as of 25 Mar 2025 09:35  Patient On (Oxygen Delivery Method): room air    PHYSICAL EXAM:  Constitutional: non-toxic appearing woman resting in bed; NAD  HEENT: atraumatic; PERRL, anicteric sclera; MMM  Neck: supple  Cardiovascular: +S1/S2, RRR  Respiratory: few scattered rhonchi and crackles b/l  Gastrointestinal: soft, NT/ND  Extremities: WWP; no edema, clubbing or cyanosis  Vascular: 2+ radial pulses B/L  Neurological: awake and alert; LERMA    LABS:  CBC Full  -  ( 25 Mar 2025 06:27 )  WBC Count : 8.29 K/uL  RBC Count : 3.83 M/uL  Hemoglobin : 11.3 g/dL  Hematocrit : 33.8 %  Platelet Count - Automated : 388 K/uL  Mean Cell Volume : 88.3 fl  Mean Cell Hemoglobin : 29.5 pg  Mean Cell Hemoglobin Concentration : 33.4 g/dL  Auto Neutrophil # : x  Auto Lymphocyte # : x  Auto Monocyte # : x  Auto Eosinophil # : x  Auto Basophil # : x  Auto Neutrophil % : x  Auto Lymphocyte % : x  Auto Monocyte % : x  Auto Eosinophil % : x  Auto Basophil % : x    03-25    137  |  106  |  15  ----------------------------<  132[H]  3.9   |  23  |  0.90    Ca    9.6      25 Mar 2025 06:27  Phos  2.6     03-25  Mg     1.9     03-25    Urinalysis Basic - ( 25 Mar 2025 06:27 )    Color: x / Appearance: x / SG: x / pH: x  Gluc: 132 mg/dL / Ketone: x  / Bili: x / Urobili: x   Blood: x / Protein: x / Nitrite: x   Leuk Esterase: x / RBC: x / WBC x   Sq Epi: x / Non Sq Epi: x / Bacteria: x    RADIOLOGY & ADDITIONAL STUDIES:  No interval chest study for review

## 2025-03-26 VITALS
RESPIRATION RATE: 18 BRPM | TEMPERATURE: 98 F | SYSTOLIC BLOOD PRESSURE: 110 MMHG | OXYGEN SATURATION: 96 % | DIASTOLIC BLOOD PRESSURE: 61 MMHG | HEART RATE: 82 BPM

## 2025-03-26 LAB
ANION GAP SERPL CALC-SCNC: 6 MMOL/L — SIGNIFICANT CHANGE UP (ref 5–17)
BUN SERPL-MCNC: 20 MG/DL — HIGH (ref 7–18)
CALCIUM SERPL-MCNC: 9.8 MG/DL — SIGNIFICANT CHANGE UP (ref 8.4–10.5)
CHLORIDE SERPL-SCNC: 106 MMOL/L — SIGNIFICANT CHANGE UP (ref 96–108)
CO2 SERPL-SCNC: 23 MMOL/L — SIGNIFICANT CHANGE UP (ref 22–31)
CREAT SERPL-MCNC: 0.86 MG/DL — SIGNIFICANT CHANGE UP (ref 0.5–1.3)
EGFR: 66 ML/MIN/1.73M2 — SIGNIFICANT CHANGE UP
EGFR: 66 ML/MIN/1.73M2 — SIGNIFICANT CHANGE UP
GLUCOSE SERPL-MCNC: 103 MG/DL — HIGH (ref 70–99)
HCT VFR BLD CALC: 36.3 % — SIGNIFICANT CHANGE UP (ref 34.5–45)
HGB BLD-MCNC: 11.9 G/DL — SIGNIFICANT CHANGE UP (ref 11.5–15.5)
MAGNESIUM SERPL-MCNC: 2 MG/DL — SIGNIFICANT CHANGE UP (ref 1.6–2.6)
MCHC RBC-ENTMCNC: 29.2 PG — SIGNIFICANT CHANGE UP (ref 27–34)
MCHC RBC-ENTMCNC: 32.8 G/DL — SIGNIFICANT CHANGE UP (ref 32–36)
MCV RBC AUTO: 89.2 FL — SIGNIFICANT CHANGE UP (ref 80–100)
NIGHT BLUE STAIN TISS: SIGNIFICANT CHANGE UP
NRBC BLD AUTO-RTO: 0 /100 WBCS — SIGNIFICANT CHANGE UP (ref 0–0)
PHOSPHATE SERPL-MCNC: 3.7 MG/DL — SIGNIFICANT CHANGE UP (ref 2.5–4.5)
PLATELET # BLD AUTO: 428 K/UL — HIGH (ref 150–400)
POTASSIUM SERPL-MCNC: 3.9 MMOL/L — SIGNIFICANT CHANGE UP (ref 3.5–5.3)
POTASSIUM SERPL-SCNC: 3.9 MMOL/L — SIGNIFICANT CHANGE UP (ref 3.5–5.3)
RBC # BLD: 4.07 M/UL — SIGNIFICANT CHANGE UP (ref 3.8–5.2)
RBC # FLD: 13.2 % — SIGNIFICANT CHANGE UP (ref 10.3–14.5)
SODIUM SERPL-SCNC: 135 MMOL/L — SIGNIFICANT CHANGE UP (ref 135–145)
SPECIMEN SOURCE: SIGNIFICANT CHANGE UP
WBC # BLD: 10.05 K/UL — SIGNIFICANT CHANGE UP (ref 3.8–10.5)
WBC # FLD AUTO: 10.05 K/UL — SIGNIFICANT CHANGE UP (ref 3.8–10.5)

## 2025-03-26 PROCEDURE — 87640 STAPH A DNA AMP PROBE: CPT

## 2025-03-26 PROCEDURE — 83735 ASSAY OF MAGNESIUM: CPT

## 2025-03-26 PROCEDURE — 87206 SMEAR FLUORESCENT/ACID STAI: CPT

## 2025-03-26 PROCEDURE — 99239 HOSP IP/OBS DSCHRG MGMT >30: CPT | Mod: GC

## 2025-03-26 PROCEDURE — 93005 ELECTROCARDIOGRAM TRACING: CPT

## 2025-03-26 PROCEDURE — 71250 CT THORAX DX C-: CPT | Mod: MC

## 2025-03-26 PROCEDURE — 36415 COLL VENOUS BLD VENIPUNCTURE: CPT

## 2025-03-26 PROCEDURE — 87637 SARSCOV2&INF A&B&RSV AMP PRB: CPT

## 2025-03-26 PROCEDURE — 87449 NOS EACH ORGANISM AG IA: CPT

## 2025-03-26 PROCEDURE — 84484 ASSAY OF TROPONIN QUANT: CPT

## 2025-03-26 PROCEDURE — 85025 COMPLETE CBC W/AUTO DIFF WBC: CPT

## 2025-03-26 PROCEDURE — 80048 BASIC METABOLIC PNL TOTAL CA: CPT

## 2025-03-26 PROCEDURE — 87116 MYCOBACTERIA CULTURE: CPT

## 2025-03-26 PROCEDURE — 83605 ASSAY OF LACTIC ACID: CPT

## 2025-03-26 PROCEDURE — 87899 AGENT NOS ASSAY W/OPTIC: CPT

## 2025-03-26 PROCEDURE — 87205 SMEAR GRAM STAIN: CPT

## 2025-03-26 PROCEDURE — 86738 MYCOPLASMA ANTIBODY: CPT

## 2025-03-26 PROCEDURE — 99285 EMERGENCY DEPT VISIT HI MDM: CPT | Mod: 25

## 2025-03-26 PROCEDURE — 83880 ASSAY OF NATRIURETIC PEPTIDE: CPT

## 2025-03-26 PROCEDURE — 87641 MR-STAPH DNA AMP PROBE: CPT

## 2025-03-26 PROCEDURE — 87015 SPECIMEN INFECT AGNT CONCNTJ: CPT

## 2025-03-26 PROCEDURE — 87070 CULTURE OTHR SPECIMN AEROBIC: CPT

## 2025-03-26 PROCEDURE — 84100 ASSAY OF PHOSPHORUS: CPT

## 2025-03-26 PROCEDURE — 96374 THER/PROPH/DIAG INJ IV PUSH: CPT

## 2025-03-26 PROCEDURE — 80053 COMPREHEN METABOLIC PANEL: CPT

## 2025-03-26 PROCEDURE — 85027 COMPLETE CBC AUTOMATED: CPT

## 2025-03-26 PROCEDURE — 97161 PT EVAL LOW COMPLEX 20 MIN: CPT

## 2025-03-26 PROCEDURE — 94640 AIRWAY INHALATION TREATMENT: CPT

## 2025-03-26 PROCEDURE — 87040 BLOOD CULTURE FOR BACTERIA: CPT

## 2025-03-26 RX ORDER — LEVOFLOXACIN 25 MG/ML
1 SOLUTION ORAL
Qty: 5 | Refills: 0
Start: 2025-03-26 | End: 2025-03-30

## 2025-03-26 RX ORDER — DEXTROMETHORPHAN HBR, GUAIFENESIN 200 MG/10ML
1 LIQUID ORAL
Qty: 10 | Refills: 0
Start: 2025-03-26 | End: 2025-03-30

## 2025-03-26 RX ORDER — ALBUTEROL SULFATE 2.5 MG/3ML
2 VIAL, NEBULIZER (ML) INHALATION
Qty: 1 | Refills: 0
Start: 2025-03-26 | End: 2025-04-24

## 2025-03-26 RX ADMIN — IPRATROPIUM BROMIDE AND ALBUTEROL SULFATE 3 MILLILITER(S): .5; 2.5 SOLUTION RESPIRATORY (INHALATION) at 09:26

## 2025-03-26 RX ADMIN — DEXTROMETHORPHAN HBR, GUAIFENESIN 1200 MILLIGRAM(S): 200 LIQUID ORAL at 06:51

## 2025-03-26 RX ADMIN — Medication 4 MILLILITER(S): at 03:03

## 2025-03-26 RX ADMIN — Medication 1 TABLET(S): at 12:00

## 2025-03-26 RX ADMIN — Medication 81 MILLIGRAM(S): at 12:00

## 2025-03-26 RX ADMIN — Medication 40 MILLIGRAM(S): at 06:51

## 2025-03-26 RX ADMIN — IPRATROPIUM BROMIDE AND ALBUTEROL SULFATE 3 MILLILITER(S): .5; 2.5 SOLUTION RESPIRATORY (INHALATION) at 03:03

## 2025-03-26 RX ADMIN — ENOXAPARIN SODIUM 40 MILLIGRAM(S): 100 INJECTION SUBCUTANEOUS at 06:51

## 2025-03-26 NOTE — DISCHARGE NOTE PROVIDER - PROVIDER TOKENS
FREE:[LAST:[Chun,],FIRST:[Eliz],PHONE:[phone]],FAX:[(   )    -],ADDRESS:[[address] [place] [address]],FOLLOWUP:[1 week]],FREE:[LAST:[Savanah],FIRST:[Mingo],PHONE:[phone]],FAX:[(   )    -],ADDRESS:[[address] [place] [address]],FOLLOWUP:[1 week]],PROVIDER:[TOKEN:[[id number]],FOLLOWUP:[1 week]]

## 2025-03-26 NOTE — DISCHARGE NOTE PROVIDER - NSDCCPCAREPLAN_GEN_ALL_CORE_FT
PRINCIPAL DISCHARGE DIAGNOSIS  Diagnosis: Multifocal pneumonia  Assessment and Plan of Treatment: You presented to the ED with complains of fatigue, cough, and shortness of breath. You were found to be hypoxic requiring oxygen supplementation. A CT Chest was performed which showed multiple scattered bilateral centrilobular nodules, tree-in-bud, ground-glass opacities and small consolidations, concerning for multifocal Pneumonia. Moreover, Mild diffuse bronchiectasis, especially pronounced in the lingula and lower lobes. Extensive patchy bilateral centrilobular tree-in-bud nodules, small consolidations, groundglass opacities, including wedge-shaped consolidation with surrounding groundglass in the anterior segment of the left upper lobe abuttingthe mediastinum, concerning for multifocal infection. Few scattered tiny calcified granulomata bilaterally. You were evaluated by both a pulmonologist and infectious disease specialist. You were started on IV antibiotics and then transition to oral antibiotics. Please continue to take LEVAQUIN 500mg daily until 3/30. Follow up with either Pulmonologist,  Dr. Kvng Koch or Dr. Eliz Mccollum, information has been provided on this hand out. Also follow up with your PCP within one week of this discharge.      SECONDARY DISCHARGE DIAGNOSES  Diagnosis: CAD (coronary artery disease)  Assessment and Plan of Treatment: You have history of coronary artery disease which is a narrowing of the arteries of your heart caused by a buildup of plaque made of cholesterol. The narrowing decreased the amount of blood flow to your heart causing chest pain, shortness of breath, sweating.   Please continue to take DAILY ASPIRIN.   Please follow with your PCP/Cardiologist in a week.      Diagnosis: Bronchiectasis  Assessment and Plan of Treatment: You presented to the ED with complains of fatigue, cough, and shortness of breath. You were found to be hypoxic requiring oxygen supplementation. A CT Chest was performed which showed multiple scattered bilateral centrilobular nodules, tree-in-bud, ground-glass opacities and small consolidations, concerning for multifocal Pneumonia. Moreover, Mild diffuse bronchiectasis, especially pronounced in the lingula and lower lobes. Extensive patchy bilateral centrilobular tree-in-bud nodules, small consolidations, groundglass opacities, including wedge-shaped consolidation with surrounding groundglass in the anterior segment of the left upper lobe abuttingthe mediastinum, concerning for multifocal infection. Few scattered tiny calcified granulomata bilaterally. You were evaluated by both a pulmonologist and infectious disease specialist. You were started on IV antibiotics and then transition to oral antibiotics. Please continue to take LEVAQUIN 500mg daily until 3/30. Follow up with either Pulmonologist,  Dr. Kvng Koch or Dr. Eliz Mccollum, information has been provided on this hand out. Also follow up with your PCP within one week of this discharge.

## 2025-03-26 NOTE — PROGRESS NOTE ADULT - ASSESSMENT
84y F with PMH of GERD, CAD, previous MI 2017, and HTN presenting with 6 days of fatigue, cough, and shortness of breath Admitted to medicine for acute hypoxic respiratory failure 2/2 multifocal pneumonia and bronchiectasis.  84y F with PMH of GERD, CAD, previous MI 2017, and HTN presenting with 6 days of fatigue, cough, and shortness of breath Admitted to medicine for acute hypoxic respiratory failure 2/2 multifocal pneumonia and bronchiectasis. Initially on cefepime and azythro. completed azythro course. switched to Lavaquin 500mg po  x 5 days.

## 2025-03-26 NOTE — PROGRESS NOTE ADULT - SUBJECTIVE AND OBJECTIVE BOX
Progress Note discussed with attending    MS TEAMS AUTHOR OF THIS NOTE TILL 5:00 PM  PLEASE CONTACT ON CALL TEAM:  - On Call Team (Please refer to Sandra) FROM 5:00 PM - 8:30PM  - Nightfloat Team FROM 8:30 -7:30 AM    CHIEF COMPLAINT & BRIEF HOSPITAL COURSE:    INTERVAL HPI/OVERNIGHT EVENTS:   MEDICATIONS  (STANDING):  albuterol/ipratropium for Nebulization 3 milliLiter(s) Nebulizer every 6 hours  aspirin  chewable 81 milliGRAM(s) Oral daily  enoxaparin Injectable 40 milliGRAM(s) SubCutaneous every 24 hours  guaiFENesin ER 1200 milliGRAM(s) Oral every 12 hours  influenza  Vaccine (HIGH DOSE) 0.5 milliLiter(s) IntraMuscular once  levoFLOXacin  Tablet 500 milliGRAM(s) Oral every 24 hours  multivitamin 1 Tablet(s) Oral daily  pantoprazole    Tablet 40 milliGRAM(s) Oral before breakfast  rosuvastatin 40 milliGRAM(s) Oral at bedtime  sodium chloride 3%  Inhalation 4 milliLiter(s) Inhalation every 8 hours    MEDICATIONS  (PRN):  acetaminophen     Tablet .. 650 milliGRAM(s) Oral every 6 hours PRN Temp greater or equal to 38C (100.4F), Mild Pain (1 - 3)  ondansetron Injectable 4 milliGRAM(s) IV Push every 8 hours PRN Nausea and/or Vomiting      REVIEW OF SYSTEMS:  CONSTITUTIONAL: No fever, weight loss, or fatigue  RESPIRATORY: No shortness of breath  CARDIOVASCULAR: No chest pain  GASTROINTESTINAL: No abdominal pain.  GENITOURINARY: No dysuria  NEUROLOGICAL: No headaches  SKIN: No itching, burning, rashes    Vital Signs Last 24 Hrs  T(C): 36.8 (26 Mar 2025 05:09), Max: 36.9 (25 Mar 2025 21:07)  T(F): 98.2 (26 Mar 2025 05:09), Max: 98.4 (25 Mar 2025 21:07)  HR: 90 (26 Mar 2025 05:09) (85 - 90)  BP: 123/85 (26 Mar 2025 05:09) (104/77 - 123/85)  BP(mean): 96 (26 Mar 2025 05:09) (86 - 96)  RR: 17 (26 Mar 2025 05:09) (17 - 18)  SpO2: 96% (26 Mar 2025 05:09) (90% - 96%)    Parameters below as of 26 Mar 2025 05:09  Patient On (Oxygen Delivery Method): room air        PHYSICAL EXAMINATION:  GENERAL: NAD, well built  HEAD:  Atraumatic, Normocephalic  EYES:  conjunctiva and sclera clear  CHEST/LUNG: Clear to auscultation. No rales, rhonchi, wheezing, or rubs  HEART: Regular rate and rhythm; No murmurs, rubs, or gallops  ABDOMEN: Soft, Nontender, Nondistended; Bowel sounds present  NERVOUS SYSTEM:  Alert & Oriented X3,    EXTREMITIES:  2+ Peripheral Pulses, No clubbing, cyanosis, or edema  SKIN: warm dry                          11.9   10.05 )-----------( [id number]      ( 26 Mar 2025 06:21 )             36.3     03-26    135  |  106  |  20[H]  ----------------------------<  103[H]  3.9   |  23  |  0.86    Ca    9.8      26 Mar 2025 06:21  Phos  3.7     03-26  Mg     2.0     03-26                CAPILLARY BLOOD GLUCOSE      RADIOLOGY & ADDITIONAL TESTS:                   Progress Note discussed with attending    MS TEAMS AUTHOR OF THIS NOTE TILL 5:00 PM  PLEASE CONTACT ON CALL TEAM:  - On Call Team (Please refer to Sandra) FROM 5:00 PM - 8:30PM  - Nightfloat Team FROM 8:30 -7:30 AM      INTERVAL HPI/OVERNIGHT EVENTS: Omani speaking,  #[id number], no overnight events. Patient states she's feeling better, although she has residual chest congestion and cough, is improved from admission. Patient denies any other complains, and inquired regarding discharge.       MEDICATIONS  (STANDING):  albuterol/ipratropium for Nebulization 3 milliLiter(s) Nebulizer every 6 hours  aspirin  chewable 81 milliGRAM(s) Oral daily  enoxaparin Injectable 40 milliGRAM(s) SubCutaneous every 24 hours  guaiFENesin ER 1200 milliGRAM(s) Oral every 12 hours  influenza  Vaccine (HIGH DOSE) 0.5 milliLiter(s) IntraMuscular once  levoFLOXacin  Tablet 500 milliGRAM(s) Oral every 24 hours  multivitamin 1 Tablet(s) Oral daily  pantoprazole    Tablet 40 milliGRAM(s) Oral before breakfast  rosuvastatin 40 milliGRAM(s) Oral at bedtime  sodium chloride 3%  Inhalation 4 milliLiter(s) Inhalation every 8 hours    MEDICATIONS  (PRN):  acetaminophen     Tablet .. 650 milliGRAM(s) Oral every 6 hours PRN Temp greater or equal to 38C (100.4F), Mild Pain (1 - 3)  ondansetron Injectable 4 milliGRAM(s) IV Push every 8 hours PRN Nausea and/or Vomiting      REVIEW OF SYSTEMS: All negative unless otherwise stated in the HPI   CONSTITUTIONAL: No fever, weight loss, or fatigue  RESPIRATORY: No shortness of breath  CARDIOVASCULAR: No chest pain  GASTROINTESTINAL: No abdominal pain.  GENITOURINARY: No dysuria  NEUROLOGICAL: No headaches  SKIN: No itching, burning, rashes    Vital Signs Last 24 Hrs  T(C): 36.8 (26 Mar 2025 05:09), Max: 36.9 (25 Mar 2025 21:07)  T(F): 98.2 (26 Mar 2025 05:09), Max: 98.4 (25 Mar 2025 21:07)  HR: 90 (26 Mar 2025 05:09) (85 - 90)  BP: 123/85 (26 Mar 2025 05:09) (104/77 - 123/85)  BP(mean): 96 (26 Mar 2025 05:09) (86 - 96)  RR: 17 (26 Mar 2025 05:09) (17 - 18)  SpO2: 96% (26 Mar 2025 05:09) (90% - 96%)    Parameters below as of 26 Mar 2025 05:09  Patient On (Oxygen Delivery Method): room air        PHYSICAL EXAMINATION:  GENERAL: NAD, well built  HEAD:  Atraumatic, Normocephalic  EYES:  conjunctiva and sclera clear  CHEST/LUNG: clear to auscultation B/L   HEART: Regular rate and rhythm; No murmurs, rubs, or gallops  ABDOMEN: Soft, Nontender, Nondistended; Bowel sounds present  NERVOUS SYSTEM:  Alert & Oriented X3,    EXTREMITIES:  2+ Peripheral Pulses, No clubbing, cyanosis, or edema  SKIN: warm dry                          11.9   10.05 )-----------( 428      ( 26 Mar 2025 06:21 )             36.3     03-26    135  |  106  |  20[H]  ----------------------------<  103[H]  3.9   |  23  |  0.86    Ca    9.8      26 Mar 2025 06:21  Phos  3.7     03-26  Mg     2.0     03-26                CAPILLARY BLOOD GLUCOSE      RADIOLOGY & ADDITIONAL TESTS:

## 2025-03-26 NOTE — DISCHARGE NOTE PROVIDER - NSDCFUADDAPPT_GEN_ALL_CORE_FT
APPTS ARE READY TO BE MADE: [X] YES    Best Family or Patient Contact (if needed):    Additional Information about above appointments (if needed):    1: Pulmonologist Dr. Condon/ [place]  2: PCP Dr. Rice      Other comments or requests:   APPTS ARE READY TO BE MADE: [X] YES    Best Family or Patient Contact (if needed):    Additional Information about above appointments (if needed):    1: Pulmonologist Dr. Condon/ [place]  2: PCP Dr. Rice      Other comments or requests:      Patients daughter Yaa informed us they already have secured a follow up appointment which is not visible on [facility] and declined to provide appointment details.

## 2025-03-26 NOTE — DISCHARGE NOTE PROVIDER - HOSPITAL COURSE
84y F with PMH of GERD, CAD, previous MI 2017, and HTN presenting with 6 days of fatigue, cough, and shortness of breath,   At ED, spO2 as low as 88% on admission improved to 93-95% on 2L NC. CT Chest shows multiple scattered bilateral centrilobular nodules, tree-in-bud, ground-glass opacities and small consolidations, concerning for multifocal infection including particular consideration to nontuberculous. Mild diffuse bronchiectasis, especially pronounced in the lingula and lower lobes. Extensive patchy bilateral centrilobular tree-in-bud nodules, small consolidations, groundglass opacities, including wedge-shaped consolidation with surrounding groundglass in the anterior segment of the left upper lobe abuttingthe mediastinum, concerning for multifocal infection. Few scattered tiny calcified granulomata bilaterally.  s/p ceftriaxone/azithromycin in the ED. Admitted to medicine for acute hypoxic respiratory failure 2/2 multifocal pneumonia and bronchiectasis.  Flu/Covid/RSV PCR negative.  Started on cefepime 2g q12 given, and completed 3 days of azithromycin. ID consulted, patient switched to levaquin 500 q24hr for 5 days to complete on 03/30. Blood cultures were negative, atypical were negative. Pending Sputum AFB results for non-TB/ atypical mycobacterial. Pulm was consulted  who recommended,  duonebs q6 ATC, mucinex 1200mg q12 for airway clearance, as well as albuterol HFA q6 PRN with sodium nebs via aerobika device. Patient is to follow with pulmonology outpatient.     Given patient's improved clinical status and current hemodynamic stability, decision was made to discharge after discussing with attending physician.      84y F with PMH of GERD, CAD, previous MI 2017, and HTN presenting with 6 days of fatigue, cough, and shortness of breath,   At ED, spO2 as low as 88% on admission improved to 93-95% on 2L NC. CT Chest shows multiple scattered bilateral centrilobular nodules, tree-in-bud, ground-glass opacities and small consolidations, concerning for multifocal infection including particular consideration to nontuberculous. Mild diffuse bronchiectasis, especially pronounced in the lingula and lower lobes. Extensive patchy bilateral centrilobular tree-in-bud nodules, small consolidations, groundglass opacities, including wedge-shaped consolidation with surrounding groundglass in the anterior segment of the left upper lobe abuttingthe mediastinum, concerning for multifocal infection. Few scattered tiny calcified granulomata bilaterally.  s/p ceftriaxone/azithromycin in the ED. Admitted to medicine for acute hypoxic respiratory failure 2/2 multifocal pneumonia and bronchiectasis.  Flu/Covid/RSV PCR negative.  Started on cefepime 2g q12 given, and completed 3 days of azithromycin. ID consulted, patient switched to levaquin 500 q24hr for 5 days to complete on 03/30. Blood cultures were negative, atypical were negative. Pending Sputum AFB results for non-TB/ atypical mycobacterial. Pulm was consulted  who recommended,  duonebs q6 ATC, mucinex 1200mg q12 for airway clearance, as well as albuterol HFA q6 PRN with sodium nebs via aerobika device. Patient is to follow with pulmonology outpatient.     Due to medical conditions, impaired ability to control trunk for mobility and decreased strength, patient benefits from increased HHA hours.     Given patient's improved clinical status and current hemodynamic stability, decision was made to discharge after discussing with attending physician.

## 2025-03-26 NOTE — PHARMACOTHERAPY INTERVENTION NOTE - COMMENTS
Recommended to switch cefepime to levofloxacin 500 mg PO q24h x5 days for PNA as per ID.      Dorinda Rust, PharmD   Clinical Pharmacy Specialist, Infectious Diseases  Tele-Antimicrobial Stewardship Program (Tele-ASP)  Tele-ASP Phone: [phone]

## 2025-03-26 NOTE — PROGRESS NOTE ADULT - PROBLEM SELECTOR PLAN 1
- p/w fatigue, SOB and productive cough with home spO2 reading 84% prior to admission  -spO2 as low as 88% on admission improved to 93-95% on 2L NC  -CT Chest shows multiple scattered bilateral centrilobular nodules, tree-in-bud, ground-glass opacities and small consolidations, concerning for multifocal infection including particular consideration to nontuberculous   mycobacterial infection.  -Flu/Covid/RSV PCR negative  -s/p ceftriaxone/azithromycin in the ED  - s/p azithromycin 500mg qd x 3 days   - strep/legionella urine ag, MRSA, Strep Pneumo negative  - Blood cultures negative  -c/w cefepime 2g q12 for pseudomonal coverage in s/o bronchiectasis  -c/w duonebs q6 ATC, mucinex 1200mg q12 for airway clearance   - c/w albuterol HFA q6 PRN with sodium nebs via aerobika device  - f/u  mycoplasma IgM  -c/w incentive spirometry, OOBTC  -c/w supportive care  - Pulm Dr. Conte following   - ID Dr. Donis following - p/w fatigue, SOB and productive cough with home spO2 reading 84% prior to admission  -spO2 as low as 88% on admission improved to 93-95% on 2L NC  -CT Chest shows multiple scattered bilateral centrilobular nodules, tree-in-bud, ground-glass opacities and small consolidations, concerning for multifocal infection including particular consideration to nontuberculous   mycobacterial infection.  -Flu/Covid/RSV PCR negative  -s/p ceftriaxone/azithromycin in the ED  - s/p azithromycin 500mg qd x 3 days   - strep/legionella urine ag, MRSA, Strep Pneumo, mycoplasma negative  - Blood cultures negative  - Sputum AFB negative for non-TB mycoplasma   -d/c cefepime 3/25  - started Levaquin 500mg PO for 5 more days   -c/w duonebs q6 ATC, mucinex 1200mg q12 for airway clearance   - c/w albuterol HFA q6 PRN with sodium nebs via aerobika device  -c/w incentive spirometry, OOBTC  -c/w supportive care  - Pulm Dr. Conte following   - ID Dr. Donis following

## 2025-03-26 NOTE — PROGRESS NOTE ADULT - PROBLEM SELECTOR PLAN 2
p/w AHRF with evidence of new bronchiectasis on CT  -CT chest: Mild diffuse bronchiectasis, especially pronounced in the lingula and lower lobes. Extensive patchy bilateral centrilobular tree-in-bud nodules, small consolidations, groundglass opacities, including wedge-shaped consolidation with surrounding groundglass in the anterior segment of the left upper lobe abuttingthe mediastinum, concerning for multifocal infection. Few scattered tiny calcified granulomata bilaterally.    SEE ABOVE p/w AHRF with evidence of new bronchiectasis on CT  -CT chest: Mild diffuse bronchiectasis, especially pronounced in the lingula and lower lobes. Extensive patchy bilateral centrilobular tree-in-bud nodules, small consolidations, ground-glass opacities, including wedge-shaped consolidation with surrounding ground-glass in the anterior segment of the left upper lobe abutting the mediastinum, concerning for multifocal infection. Few scattered tiny calcified granulomata bilaterally.    SEE ABOVE

## 2025-03-26 NOTE — PHARMACOTHERAPY INTERVENTION NOTE - COMMENTS
Recommended to discontinue azithromycin in the setting of negative Legionella antigen test in patient being treated for PNA as per ID.      Dorinda Rust, PharmD   Clinical Pharmacy Specialist, Infectious Diseases  Tele-Antimicrobial Stewardship Program (Tele-ASP)  Tele-ASP Phone: [phone]

## 2025-03-26 NOTE — DISCHARGE NOTE PROVIDER - CARE PROVIDER_API CALL
Mccollum Irina  [address] [facility] [address] [facility], [place] [address]  Phone: [phone]  Fax: (   )    -  Follow Up Time: 1 week    Mingo Pavon  [address] [facility] [address] [facility], [place] [address]  Phone: [phone]  Fax: (   )    -  Follow Up Time: 1 week    [facility]  [address] [place] [address], [address]  [place] [address]  Phone: [phone]  Fax: [phone]  Follow Up Time: 1 week

## 2025-03-26 NOTE — PROGRESS NOTE ADULT - PROBLEM SELECTOR PLAN 3
hx of CAD w/ reported MI in 2017  -c/w home aspirin and statin

## 2025-03-26 NOTE — DISCHARGE NOTE PROVIDER - NSDCCAREPROVSEEN_GEN_ALL_CORE_FT
Jose, Yesi Conte, Tio Forte, Elizabeth Bailon, Benji Whitfield, Mando Gonzales, Ercik Parker, Airam Donis, Sam Murillo
No

## 2025-03-26 NOTE — PROGRESS NOTE ADULT - PROBLEM/PLAN-4
DISPLAY PLAN FREE TEXT
Is This A New Presentation, Or A Follow-Up?: Skin Lesion
What Type Of Note Output Would You Prefer (Optional)?: Standard Output
How Severe Is Your Skin Lesion?: moderate
Has Your Skin Lesion Been Treated?: not been treated

## 2025-03-26 NOTE — DISCHARGE NOTE PROVIDER - NSDCMRMEDTOKEN_GEN_ALL_CORE_FT
Albuterol (Eqv-Proventil HFA) 90 mcg/inh inhalation aerosol: 2 puff(s) inhaled every 6 hours as needed for  shortness of breath and/or wheezing  aspirin 81 mg oral tablet: 1 tab(s) orally once a day  cholecalciferol 50 mcg (2000 intl units) oral tablet: 1 tab(s) orally once a day  guaiFENesin 1200 mg oral tablet, extended release: 1 tab(s) orally every 12 hours  levoFLOXacin 500 mg oral tablet: 1 tab(s) orally every 24 hours  meclizine 12.5 mg oral tablet: 1 tab(s) orally every 12 hours as needed for  dizziness  omeprazole 20 mg oral delayed release tablet: 1 tab(s) orally once a day  rosuvastatin 40 mg oral tablet: 1 tab(s) orally once a day (at bedtime)

## 2025-03-28 LAB
CULTURE RESULTS: SIGNIFICANT CHANGE UP
CULTURE RESULTS: SIGNIFICANT CHANGE UP
SPECIMEN SOURCE: SIGNIFICANT CHANGE UP
SPECIMEN SOURCE: SIGNIFICANT CHANGE UP

## 2025-04-02 LAB — NIGHT BLUE STAIN TISS: ABNORMAL

## 2025-04-02 NOTE — CHART NOTE - NSCHARTNOTEFT_GEN_A_CORE
I received a message regarding positive ABF sputum culture from March when the patient was admitted. I notified ID Dr. Donis following the patient at the time to confirm the results. I received a message regarding positive ABF sputum culture from March when the patient was admitted. I notified ID Dr. Donis following the patient at the time, and received a recommendation to follow up on PCR to confirm the species as the likelihood of NTM is relatively high.

## 2025-04-06 LAB — NIGHT BLUE STAIN TISS: ABNORMAL

## 2025-04-06 NOTE — CHART NOTE - NSCHARTNOTEFT_GEN_A_CORE
Received call from [employer]- March 26th acid fast bacilli. Reviewed chart, this acid fast bacilli growth was relayed to ID April 2nd, and are awaiting PCR confirmation

## 2025-04-21 NOTE — ED ADULT NURSE NOTE - CARDIO ASSESSMENT
Received refill request for Spironolactone 25 MG Tablets from [facility] pharmacy.     Last OV: 2/20/2025 VNZ    Next OV: None    Last Labs: 2/20/2025 CMP    Last Filled: 1/21/2025    ---

## 2025-05-05 PROBLEM — K21.9 GASTRO-ESOPHAGEAL REFLUX DISEASE WITHOUT ESOPHAGITIS: Chronic | Status: ACTIVE | Noted: 2025-03-23

## 2025-05-09 ENCOUNTER — OUTPATIENT (OUTPATIENT)
Dept: OUTPATIENT SERVICES | Facility: HOSPITAL | Age: 85
LOS: 1 days | End: 2025-05-09
Payer: MEDICAID

## 2025-05-09 DIAGNOSIS — I35.0 NONRHEUMATIC AORTIC (VALVE) STENOSIS: ICD-10-CM

## 2025-05-09 PROCEDURE — 93312 ECHO TRANSESOPHAGEAL: CPT | Mod: 26

## 2025-05-09 PROCEDURE — 93320 DOPPLER ECHO COMPLETE: CPT | Mod: 26

## 2025-05-09 PROCEDURE — 93325 DOPPLER ECHO COLOR FLOW MAPG: CPT

## 2025-05-09 PROCEDURE — 93312 ECHO TRANSESOPHAGEAL: CPT

## 2025-05-09 PROCEDURE — 93325 DOPPLER ECHO COLOR FLOW MAPG: CPT | Mod: 26

## 2025-05-09 PROCEDURE — 93320 DOPPLER ECHO COMPLETE: CPT
